# Patient Record
Sex: MALE | Race: BLACK OR AFRICAN AMERICAN | NOT HISPANIC OR LATINO | Employment: OTHER | URBAN - METROPOLITAN AREA
[De-identification: names, ages, dates, MRNs, and addresses within clinical notes are randomized per-mention and may not be internally consistent; named-entity substitution may affect disease eponyms.]

---

## 2018-10-22 ENCOUNTER — OFFICE VISIT (OUTPATIENT)
Dept: INTERNAL MEDICINE | Facility: CLINIC | Age: 63
End: 2018-10-22
Payer: COMMERCIAL

## 2018-10-22 ENCOUNTER — HOSPITAL ENCOUNTER (OUTPATIENT)
Dept: RADIOLOGY | Facility: HOSPITAL | Age: 63
Discharge: HOME | End: 2018-10-22
Attending: FAMILY MEDICINE
Payer: COMMERCIAL

## 2018-10-22 VITALS
WEIGHT: 136.69 LBS | DIASTOLIC BLOOD PRESSURE: 80 MMHG | TEMPERATURE: 99 F | OXYGEN SATURATION: 97 % | HEART RATE: 86 BPM | BODY MASS INDEX: 20.24 KG/M2 | SYSTOLIC BLOOD PRESSURE: 122 MMHG | HEIGHT: 69 IN

## 2018-10-22 DIAGNOSIS — M54.41 CHRONIC BILATERAL LOW BACK PAIN WITH BILATERAL SCIATICA: Chronic | ICD-10-CM

## 2018-10-22 DIAGNOSIS — I10 ESSENTIAL HYPERTENSION: Chronic | ICD-10-CM

## 2018-10-22 DIAGNOSIS — M54.42 CHRONIC BILATERAL LOW BACK PAIN WITH BILATERAL SCIATICA: Chronic | ICD-10-CM

## 2018-10-22 DIAGNOSIS — Z85.46 HISTORY OF PROSTATE CANCER: ICD-10-CM

## 2018-10-22 DIAGNOSIS — Z86.73 HISTORY OF TRANSIENT ISCHEMIC ATTACK (TIA): Primary | ICD-10-CM

## 2018-10-22 DIAGNOSIS — R73.09 ABNORMAL GLUCOSE: ICD-10-CM

## 2018-10-22 DIAGNOSIS — G89.29 CHRONIC BILATERAL LOW BACK PAIN WITH BILATERAL SCIATICA: Chronic | ICD-10-CM

## 2018-10-22 DIAGNOSIS — N39.498 OTHER URINARY INCONTINENCE: ICD-10-CM

## 2018-10-22 PROBLEM — R32 ABSENCE OF BLADDER CONTINENCE: Status: ACTIVE | Noted: 2018-10-22

## 2018-10-22 PROCEDURE — 99204 OFFICE O/P NEW MOD 45 MIN: CPT | Mod: S$GLB,,, | Performed by: FAMILY MEDICINE

## 2018-10-22 PROCEDURE — 72100 X-RAY EXAM L-S SPINE 2/3 VWS: CPT | Mod: TC,FY,PO

## 2018-10-22 PROCEDURE — 99999 PR PBB SHADOW E&M-NEW PATIENT-LVL V: CPT | Mod: PBBFAC,,, | Performed by: FAMILY MEDICINE

## 2018-10-22 PROCEDURE — 3008F BODY MASS INDEX DOCD: CPT | Mod: CPTII,S$GLB,, | Performed by: FAMILY MEDICINE

## 2018-10-22 PROCEDURE — 72100 X-RAY EXAM L-S SPINE 2/3 VWS: CPT | Mod: 26,,, | Performed by: RADIOLOGY

## 2018-10-22 RX ORDER — ASPIRIN 81 MG/1
81 TABLET ORAL DAILY
Refills: 0 | COMMUNITY
Start: 2018-10-22 | End: 2022-09-02

## 2018-10-22 RX ORDER — AMLODIPINE BESYLATE 10 MG/1
10 TABLET ORAL DAILY
Qty: 90 TABLET | Refills: 0 | Status: SHIPPED | OUTPATIENT
Start: 2018-10-22 | End: 2019-01-11 | Stop reason: SDUPTHER

## 2018-10-22 RX ORDER — AMLODIPINE BESYLATE 10 MG/1
TABLET ORAL
Refills: 0 | COMMUNITY
Start: 2018-10-04 | End: 2018-10-22 | Stop reason: SDUPTHER

## 2018-10-22 NOTE — PROGRESS NOTES
CHIEF COMPLAINT  Establish Care      This is my first time treating him here. All problems addressed today are NEW TO ME.     HISTORY OF PRESENT ILLNESS    PROBLEM/CONDITION: He reports recent trip to emergency department for TIA, apparently precipitated by uncontrolled hypertension because he was not taking his amlodipine 5 mg. He was given a prescription for amlodipine 10 mg and has been taking this medication since then, and his blood pressure appears well-controlled, and he appears to be tolerating this medication well without adverse effect. I reviewed test results from [facility] emergency department, including HIV negative glucose 136, total protein 8.5, , troponin less than 0.01, BNP 27, hemoglobin 16.7, hematocrit 48.0, platelets 204,000, CT scan of head negative, chest x-ray negative.    PROBLEM/CONDITION: Nonspecific nonfasting elevated glucose noted. He reports no polyuria or polydipsia.    PROBLEM/CONDITION: He has history of prostate cancer and is overdue for PSA monitoring.    PROBLEM/CONDITION: He reports that he has had MILD urinary leakage ever since his prostatectomy. He reports no dysuria or hematuria.    PROBLEM/CONDITION: He reports greater than one year history of chronic recurrent bilateral low back pain with radiation down legs bilaterally, EXACERBATED by prolonged sitting. We discussed differential diagnosis.    Problem List Items Addressed This Visit        Neuro    History of transient ischemic attack (TIA) - Primary       Cardiac/Vascular    Essential hypertension (Chronic)    Relevant Medications    amLODIPine (NORVASC) 10 MG tablet    aspirin (ECOTRIN) 81 MG EC tablet    Other Relevant Orders    Comprehensive metabolic panel    Lipid panel    TSH    SCHEDULED EKG 12-LEAD (to Muse)       Renal/    Absence of bladder continence    Relevant Orders    Ambulatory referral to Urology       Oncology    History of prostate cancer    Relevant Orders    PSA, Screening     Ambulatory referral to Urology       Endocrine    Abnormal glucose    Relevant Medications    aspirin (ECOTRIN) 81 MG EC tablet    Other Relevant Orders    Hemoglobin A1c    Comprehensive metabolic panel    Lipid panel    TSH       Orthopedic    Chronic bilateral low back pain with bilateral sciatica (Chronic)    Relevant Orders    X-Ray Lumbar Spine AP And Lateral    Ambulatory Referral to Physical/Occupational Therapy          PAST MEDICAL HISTORY REVIEWED AND UPDATED  Past Medical History:   Diagnosis Date    Hypertension     Prostate cancer        SURGICAL HISTORY REVIEWED AND UPDATED  Past Surgical History:   Procedure Laterality Date    HERNIA REPAIR  Dec. 2011    PROSTATECTOMY  2009       CURRENT MEDICATION LIST REVIEWED AND UPDATED  Outpatient Encounter Medications as of 10/22/2018   Medication Sig Dispense Refill    AFLURIA QUAD [id number], PF, 60 mcg/0.5 mL vaccine ADM 0.5ML IM UTD  0    amLODIPine (NORVASC) 10 MG tablet Take 1 tablet (10 mg total) by mouth once daily. (FOR BLOOD PRESSURE) 90 tablet 0    ibuprofen (ADVIL,MOTRIN) 100 MG tablet Take 200 mg by mouth every 6 (six) hours as needed.        [DISCONTINUED] amLODIPine (NORVASC) 10 MG tablet TK 1 T PO  D FOR 30 DAYS  0    [DISCONTINUED] cyanocobalamin (VITAMIN B-12) 1000 MCG tablet Take 100 mcg by mouth once daily.      [DISCONTINUED] hydrochlorothiazide (HYDRODIURIL) 25 MG tablet Take 25 mg by mouth once daily.        aspirin (ECOTRIN) 81 MG EC tablet Take 1 tablet (81 mg total) by mouth once daily.  0     No facility-administered encounter medications on file as of 10/22/2018.        ALLERGY LIST REVIEWED AND UPDATED  Allergies as of 10/22/2018 - Reviewed 10/22/2018   Allergen Reaction Noted    Lisinopril  10/22/2018       SOCIAL HISTORY REVIEWED AND UPDATED  TOBACCO USE HISTORY:   Social History     Tobacco Use   Smoking Status Current Every Day Smoker    Packs/day: 0.50    Years: 35.00    Pack years: 17.50   Smokeless Tobacco Never  "Used     ALCOHOL USE HISTORY:  Social History     Substance and Sexual Activity   Alcohol Use Yes    Alcohol/week: 1.0 oz    Types: 2 Standard drinks or equivalent per week     RECREATIONAL DRUG USE HISTORY:  Social History     Substance and Sexual Activity   Drug Use No       FAMILY HISTORY REVIEWED AND UPDATED  Family History   Problem Relation Age of Onset    Stroke Mother     Prostate cancer Father 79    Cancer Sister         brain     Diabetes Sister     Coronary artery disease Brother        REVIEW OF SYSTEMS  CONSTITUTIONAL: No fever reported.   CARDIOVASCULAR: No angina reported.   PULMONARY: No hemoptysis reported.   PSYCHIATRIC: No mood instability reported.   NEUROLOGIC: No seizures reported.   ENDOCRINE: No polydipsia reported.   GASTROINTESTINAL: No blood in stool reported.   GENITOURINARY: No hematuria reported.   ENT: No acute hearing changes reported.   OPHTHALMOLOGIC: No acute vision changes reported.   HEMATOLOGIC: No bleeding problems reported.   MUSCULOSKELETAL: No recent injuries reported.   DERMATOLOGIC: No skin infection reported.   REMAINDER OF COMPLETE REVIEW OF SYSTEMS is negative or noncontributory to present illness except as noted herein.    PHYSICAL EXAM  Vitals:    10/22/18 1618   BP: 122/80   BP Location: Right arm   Patient Position: Sitting   BP Method: Medium (Manual)   Pulse: 86   Temp: 98.9 °F (37.2 °C)   TempSrc: Tympanic   SpO2: 97%   Weight: 62 kg (136 lb 11 oz)   Height: 5' 9" (1.753 m)     CONSTITUTIONAL: Vital signs noted. No apparent distress. Does not appear acutely ill or septic. Appears adequately hydrated.   EYE: Sclerae anicteric. Lids and conjunctiva unremarkable.   ENT: External ENT unremarkable. Oropharynx moist.   NECK: Trachea midline. Thyroid nontender.   PULM: Lungs clear. Breathing unlabored.   CV: Auscultation reveals regular rate and rhythm without murmur, gallop or rub. No carotid bruit.   GI: Soft and nontender. Bowel sounds normal.   DERM: Skin " warm and moist with normal turgor.   NEURO: There are no gross focal motor deficits or gross deficits of cranial nerves III-XII.   PSYCH: Alert and oriented x 3. Mood is grossly neutral. Affect appropriate. Judgment and insight not grossly compromised.   MSK: Grossly normal stance and gait. Thoracolumbar spine palpates normally and reasonably symmetric.  Straight-leg raise test is positive bilaterally. LE strength, and DTRs symmetric and normal.      ASSESSMENT and PLAN  History of transient ischemic attack (TIA)    Chronic bilateral low back pain with bilateral sciatica  -     X-Ray Lumbar Spine AP And Lateral; Future; Expected date: 10/22/2018  -     Ambulatory Referral to Physical/Occupational Therapy    History of prostate cancer  -     PSA, Screening; Future; Expected date: 10/22/2018  -     Ambulatory referral to Urology    Essential hypertension  -     amLODIPine (NORVASC) 10 MG tablet; Take 1 tablet (10 mg total) by mouth once daily. (FOR BLOOD PRESSURE)  Dispense: 90 tablet; Refill: 0  -     Comprehensive metabolic panel; Future; Expected date: 10/22/2018  -     Lipid panel; Future; Expected date: 10/22/2018  -     TSH; Future; Expected date: 10/22/2018  -     aspirin (ECOTRIN) 81 MG EC tablet; Take 1 tablet (81 mg total) by mouth once daily.; Refill: 0  -     SCHEDULED EKG 12-LEAD (to Muse); Future; Expected date: 10/23/2018    Abnormal glucose  -     Hemoglobin A1c; Future; Expected date: 10/22/2018  -     Comprehensive metabolic panel; Future; Expected date: 10/22/2018  -     Lipid panel; Future; Expected date: 10/22/2018  -     TSH; Future; Expected date: 10/22/2018  -     aspirin (ECOTRIN) 81 MG EC tablet; Take 1 tablet (81 mg total) by mouth once daily.; Refill: 0    Other urinary incontinence  -     Ambulatory referral to Urology        PRESCRIPTION MEDICATION MANAGEMENT  Medications Ordered This Encounter   Medications    amLODIPine (NORVASC) 10 MG tablet     Sig: Take 1 tablet (10 mg total) by  "mouth once daily. (FOR BLOOD PRESSURE)     Dispense:  90 tablet     Refill:  0    aspirin (ECOTRIN) 81 MG EC tablet     Sig: Take 1 tablet (81 mg total) by mouth once daily.     Refill:  0     Medications Discontinued During This Encounter   Medication Reason    cyanocobalamin (VITAMIN B-12) 1000 MCG tablet Patient no longer taking    hydrochlorothiazide (HYDRODIURIL) 25 MG tablet Patient no longer taking    amLODIPine (NORVASC) 10 MG tablet Reorder       Follow-up in about 1 week (around 10/29/2018) for review test results and discuss treatment plan, re-evaluate problem(s) discussed today.    There are no Patient Instructions on file for this visit.    ABOUT THIS DOCUMENTATION:  · The order of the conditions listed in the HPI is one of convenience and does not necessarily reflect the chronology of the appointment, nor the relative importance of a condition.  · Documentation entered by me for this encounter was done in part using speech-recognition technology. Although I have made an effort to ensure accuracy, "sound like" errors may exist and should be interpreted in context.                        -ADY Dawn MD     "

## 2018-10-23 ENCOUNTER — OFFICE VISIT (OUTPATIENT)
Dept: UROLOGY | Facility: CLINIC | Age: 63
End: 2018-10-23
Payer: COMMERCIAL

## 2018-10-23 ENCOUNTER — CLINICAL SUPPORT (OUTPATIENT)
Dept: CARDIOLOGY | Facility: CLINIC | Age: 63
End: 2018-10-23
Payer: COMMERCIAL

## 2018-10-23 ENCOUNTER — PATIENT MESSAGE (OUTPATIENT)
Dept: ADMINISTRATIVE | Facility: OTHER | Age: 63
End: 2018-10-23

## 2018-10-23 VITALS
SYSTOLIC BLOOD PRESSURE: 148 MMHG | DIASTOLIC BLOOD PRESSURE: 76 MMHG | HEART RATE: 59 BPM | WEIGHT: 136.69 LBS | HEIGHT: 69 IN | BODY MASS INDEX: 20.24 KG/M2

## 2018-10-23 DIAGNOSIS — C61 PROSTATE CANCER: Primary | ICD-10-CM

## 2018-10-23 DIAGNOSIS — R32 URINARY INCONTINENCE, UNSPECIFIED TYPE: ICD-10-CM

## 2018-10-23 DIAGNOSIS — N52.9 ERECTILE DYSFUNCTION, UNSPECIFIED ERECTILE DYSFUNCTION TYPE: ICD-10-CM

## 2018-10-23 DIAGNOSIS — I10 ESSENTIAL HYPERTENSION: Chronic | ICD-10-CM

## 2018-10-23 LAB
BILIRUB SERPL-MCNC: NORMAL MG/DL
BLOOD URINE, POC: NORMAL
COLOR, POC UA: YELLOW
GLUCOSE UR QL STRIP: NORMAL
KETONES UR QL STRIP: NORMAL
LEUKOCYTE ESTERASE URINE, POC: NORMAL
NITRITE, POC UA: NORMAL
PH, POC UA: 7
PROTEIN, POC: NORMAL
SPECIFIC GRAVITY, POC UA: 1.01
UROBILINOGEN, POC UA: NORMAL

## 2018-10-23 PROCEDURE — 99999 PR PBB SHADOW E&M-EST. PATIENT-LVL III: CPT | Mod: PBBFAC,,, | Performed by: UROLOGY

## 2018-10-23 PROCEDURE — 99244 OFF/OP CNSLTJ NEW/EST MOD 40: CPT | Mod: 25,S$GLB,, | Performed by: UROLOGY

## 2018-10-23 PROCEDURE — 93000 ELECTROCARDIOGRAM COMPLETE: CPT | Mod: S$GLB,,, | Performed by: INTERNAL MEDICINE

## 2018-10-23 PROCEDURE — 81002 URINALYSIS NONAUTO W/O SCOPE: CPT | Mod: S$GLB,,, | Performed by: UROLOGY

## 2018-10-23 RX ORDER — PAPAVERINE HYDROCHLORIDE 30 MG/ML
INJECTION INTRAMUSCULAR; INTRAVENOUS
Qty: 10 ML | Refills: 5 | Status: SHIPPED | OUTPATIENT
Start: 2018-10-23 | End: 2022-09-02

## 2018-10-23 RX ORDER — OXYBUTYNIN CHLORIDE 5 MG/1
5 TABLET ORAL 3 TIMES DAILY
Qty: 90 TABLET | Refills: 11 | Status: ON HOLD | OUTPATIENT
Start: 2018-10-23 | End: 2022-09-02

## 2018-10-23 NOTE — PROGRESS NOTES
Chief Complaint: Prostate Cancer    HPI:   10/23/18: 62 yo man had open RRP in 2009 with a [place] urologist he can't remember who, the hospital, or any other details.  Says PSA was undetectable 2 years ago.  No abd/pelvic pain and no exac/rel factors.  No hematuria.  No urolithiasis.  Has incontinence when he gets sexually excited.  Sudden urgency.  No leakage other times, just the urgency.  Drinks two cups coffee a day and some tea.  Referred by Dr. Dawn.  Starts to get an erection but no more than that.  Has tried viagra it didn't work.    Allergies:  Lisinopril    Medications:  has a current medication list which includes the following prescription(s): afluria quad 4126-4297 (pf), amlodipine, aspirin, and ibuprofen.    Review of Systems:  General: No fever, chills, fatigability, or weight loss.  Skin: No rashes, itching, or changes in color or texture of skin.  Chest: Denies PUCKETT, cyanosis, wheezing, cough, and sputum production.  Abdomen: Appetite fine. No weight loss. Denies diarrhea, abdominal pain, hematemesis, or blood in stool.  Musculoskeletal: No joint stiffness or swelling. Chronic back pain.  : As above.  All other review of systems negative.    PMH:   has a past medical history of Hypertension and Prostate cancer.    PSH:   has a past surgical history that includes Hernia repair (Dec. 2011) and Prostatectomy (2009).    FamHx: family history includes Cancer in his sister; Coronary artery disease in his brother; Diabetes in his sister; Prostate cancer (age of onset: 79) in his father; Stroke in his mother.    SocHx:  reports that he has been smoking.  He has a 17.50 pack-year smoking history. he has never used smokeless tobacco. He reports that he drinks about 1.0 oz of alcohol per week. He reports that he does not use drugs.      Physical Exam:  Vitals:    10/23/18 1711   BP: (!) 148/76   Pulse: (!) 59     General: A&Ox3, no apparent distress, no deformities  Neck: No masses, normal  thyroid  Lungs: normal inspiration, no use of accessory muscles  Heart: normal pulse, no arrhythmias  Abdomen: Soft, NT, ND, no masses, no hernias, no hepatosplenomegaly  Lymphatic: Neck and groin nodes negative  Skin: The skin is warm and dry. No jaundice.  Ext: No c/c/e.  : Test desc torrie, no abnormalities of epididymus. Penis normal, with normal penile and scrotal skin. Meatus normal.     Labs/Studies: Urinalysis performed in clinic, summary: UA normal exc tr blood    Impression/Plan:   1. PSA today and PSA/RTC 1 year  2. Oxybutinin for OAB, with caffeine cessation and timed voiding.  3. Discussed trimix for ED, rx given.

## 2018-10-23 NOTE — LETTER
October 23, 2018      ADY Dwan MD  [address] [facility] [address]           [facility]  [address] [place] [id number]  Phone: [phone]  Fax: [phone]          Patient: Gunner Estes   MR Number: [id number]   YOB: 1955   Date of Visit: 10/23/2018       Dear Dr. ADY Dawn:    Thank you for referring Gunner Estes to me for evaluation. Attached you will find relevant portions of my assessment and plan of care.    If you have questions, please do not hesitate to call me. I look forward to following Gunner Estes along with you.    Sincerely,    Jorge Coker IV, MD    Enclosure  CC:  No Recipients    If you would like to receive this communication electronically, please contact [email or web address] or [phone] to request more information on [employer] access.    For providers and/or their staff who would like to refer a patient to [facility], please contact us through our one-stop-shop provider referral line, [facility], at [phone].    If you feel you have received this communication in error or would no longer like to receive these types of communications, please e-mail [email or web address]

## 2018-10-28 NOTE — PROGRESS NOTES
"Hi, Gunner.    I'm happy to report that your EKG is NORMAL.    If you have any questions about your test results, write them down and bring them with you to your next appointment. You can call or message me in the meantime if you have urgent questions.    Thank you for letting me care for you. I look forward to seeing you at your next appointment.    Sincerely,    ADY Dawn MD    P.S. - Want to learn more about your test results and what they mean? It's as simple as 1, 2, 3.     (1) Log in to your [facility] account at [email or web address]     (2) From the "View test results" tab, click on the test you want to know more about.     (3) Click on the "About This Test" link."

## 2018-10-29 NOTE — PROGRESS NOTES
"Hi, Gunner.    Your lumbar spine x-rays showed "degenerative" or "wear-and-tear" type changes. The x-rays did NOT show any fracture or evidence of a tumor.    If you have any questions about your test results, write them down and bring them with you to your next appointment. You can call or message me in the meantime if you have urgent questions.    Thank you for letting me care for you. I look forward to seeing you at your next appointment.    Sincerely,    ADY Dawn MD    P.S. - Want to learn more about your test results and what they mean? It's as simple as 1, 2, 3.     (1) Log in to your [facility] account at [email or web address]     (2) From the "View test results" tab, click on the test you want to know more about.     (3) Click on the "About This Test" link."

## 2018-10-30 ENCOUNTER — CLINICAL SUPPORT (OUTPATIENT)
Dept: UROLOGY | Facility: CLINIC | Age: 63
End: 2018-10-30
Payer: COMMERCIAL

## 2018-10-30 ENCOUNTER — LAB VISIT (OUTPATIENT)
Dept: LAB | Facility: HOSPITAL | Age: 63
End: 2018-10-30
Attending: UROLOGY
Payer: COMMERCIAL

## 2018-10-30 DIAGNOSIS — R32 URINARY INCONTINENCE, UNSPECIFIED TYPE: ICD-10-CM

## 2018-10-30 DIAGNOSIS — N52.9 ERECTILE DYSFUNCTION, UNSPECIFIED ERECTILE DYSFUNCTION TYPE: Primary | ICD-10-CM

## 2018-10-30 DIAGNOSIS — C61 PROSTATE CANCER: ICD-10-CM

## 2018-10-30 DIAGNOSIS — N52.9 ERECTILE DYSFUNCTION, UNSPECIFIED ERECTILE DYSFUNCTION TYPE: ICD-10-CM

## 2018-10-30 LAB — COMPLEXED PSA SERPL-MCNC: 0.24 NG/ML

## 2018-10-30 PROCEDURE — 99999 PR PBB SHADOW E&M-EST. PATIENT-LVL II: CPT | Mod: PBBFAC,,,

## 2018-10-30 PROCEDURE — 36415 COLL VENOUS BLD VENIPUNCTURE: CPT

## 2018-10-30 PROCEDURE — 84153 ASSAY OF PSA TOTAL: CPT

## 2018-10-30 NOTE — PROGRESS NOTES
Taught patient to self-administer Trimix injection using normal saline. Patient rhiannon up medication as instructed using clean technique and identified proper injection site. Advised patient to go to ER if he has an erection lasting longer than 4 hours. Patient states understanding.

## 2018-11-16 ENCOUNTER — PATIENT MESSAGE (OUTPATIENT)
Dept: UROLOGY | Facility: CLINIC | Age: 63
End: 2018-11-16

## 2018-11-16 ENCOUNTER — PATIENT MESSAGE (OUTPATIENT)
Dept: INTERNAL MEDICINE | Facility: CLINIC | Age: 63
End: 2018-11-16

## 2019-01-07 ENCOUNTER — PATIENT MESSAGE (OUTPATIENT)
Dept: INTERNAL MEDICINE | Facility: CLINIC | Age: 64
End: 2019-01-07

## 2019-01-08 ENCOUNTER — PATIENT MESSAGE (OUTPATIENT)
Dept: INTERNAL MEDICINE | Facility: CLINIC | Age: 64
End: 2019-01-08

## 2019-01-09 ENCOUNTER — PATIENT MESSAGE (OUTPATIENT)
Dept: FAMILY MEDICINE | Facility: CLINIC | Age: 64
End: 2019-01-09

## 2019-01-11 ENCOUNTER — LAB VISIT (OUTPATIENT)
Dept: LAB | Facility: HOSPITAL | Age: 64
End: 2019-01-11
Payer: COMMERCIAL

## 2019-01-11 ENCOUNTER — OFFICE VISIT (OUTPATIENT)
Dept: FAMILY MEDICINE | Facility: CLINIC | Age: 64
End: 2019-01-11
Payer: COMMERCIAL

## 2019-01-11 VITALS
TEMPERATURE: 99 F | WEIGHT: 139.56 LBS | HEART RATE: 72 BPM | BODY MASS INDEX: 20.67 KG/M2 | OXYGEN SATURATION: 95 % | SYSTOLIC BLOOD PRESSURE: 124 MMHG | HEIGHT: 69 IN | RESPIRATION RATE: 16 BRPM | DIASTOLIC BLOOD PRESSURE: 84 MMHG

## 2019-01-11 DIAGNOSIS — I10 ESSENTIAL HYPERTENSION: Primary | Chronic | ICD-10-CM

## 2019-01-11 DIAGNOSIS — M54.42 CHRONIC BILATERAL LOW BACK PAIN WITH BILATERAL SCIATICA: Chronic | ICD-10-CM

## 2019-01-11 DIAGNOSIS — Z11.59 ENCOUNTER FOR HEPATITIS C SCREENING TEST FOR LOW RISK PATIENT: ICD-10-CM

## 2019-01-11 DIAGNOSIS — Z86.73 HISTORY OF TRANSIENT ISCHEMIC ATTACK (TIA): ICD-10-CM

## 2019-01-11 DIAGNOSIS — Z85.46 HISTORY OF PROSTATE CANCER: ICD-10-CM

## 2019-01-11 DIAGNOSIS — M54.41 CHRONIC BILATERAL LOW BACK PAIN WITH BILATERAL SCIATICA: Chronic | ICD-10-CM

## 2019-01-11 DIAGNOSIS — R73.09 ABNORMAL GLUCOSE: ICD-10-CM

## 2019-01-11 DIAGNOSIS — I10 ESSENTIAL HYPERTENSION: Chronic | ICD-10-CM

## 2019-01-11 DIAGNOSIS — G89.29 CHRONIC BILATERAL LOW BACK PAIN WITH BILATERAL SCIATICA: Chronic | ICD-10-CM

## 2019-01-11 DIAGNOSIS — Z23 NEED FOR DIPHTHERIA-TETANUS-PERTUSSIS (TDAP) VACCINE: ICD-10-CM

## 2019-01-11 DIAGNOSIS — Z23 NEED FOR VACCINATION WITH 13-POLYVALENT PNEUMOCOCCAL CONJUGATE VACCINE: ICD-10-CM

## 2019-01-11 LAB
ALBUMIN SERPL BCP-MCNC: 3.6 G/DL
ALP SERPL-CCNC: 103 U/L
ALT SERPL W/O P-5'-P-CCNC: 9 U/L
ANION GAP SERPL CALC-SCNC: 8 MMOL/L
AST SERPL-CCNC: 19 U/L
BILIRUB SERPL-MCNC: 0.2 MG/DL
BUN SERPL-MCNC: 9 MG/DL
CALCIUM SERPL-MCNC: 9.5 MG/DL
CHLORIDE SERPL-SCNC: 104 MMOL/L
CHOLEST SERPL-MCNC: 158 MG/DL
CHOLEST/HDLC SERPL: 4.3 {RATIO}
CO2 SERPL-SCNC: 29 MMOL/L
CREAT SERPL-MCNC: 0.9 MG/DL
EST. GFR  (AFRICAN AMERICAN): >60 ML/MIN/1.73 M^2
EST. GFR  (NON AFRICAN AMERICAN): >60 ML/MIN/1.73 M^2
GLUCOSE SERPL-MCNC: 103 MG/DL
HDLC SERPL-MCNC: 37 MG/DL
HDLC SERPL: 23.4 %
LDLC SERPL CALC-MCNC: 93.2 MG/DL
NONHDLC SERPL-MCNC: 121 MG/DL
POTASSIUM SERPL-SCNC: 4.7 MMOL/L
PROT SERPL-MCNC: 7.7 G/DL
SODIUM SERPL-SCNC: 141 MMOL/L
TRIGL SERPL-MCNC: 139 MG/DL
TSH SERPL DL<=0.005 MIU/L-ACNC: 0.76 UIU/ML

## 2019-01-11 PROCEDURE — 80061 LIPID PANEL: CPT

## 2019-01-11 PROCEDURE — 90670 PNEUMOCOCCAL CONJUGATE VACCINE 13-VALENT LESS THAN 5YO & GREATER THAN: ICD-10-PCS | Mod: S$GLB,,, | Performed by: FAMILY MEDICINE

## 2019-01-11 PROCEDURE — 90471 IMMUNIZATION ADMIN: CPT | Mod: 59,S$GLB,, | Performed by: FAMILY MEDICINE

## 2019-01-11 PROCEDURE — 3079F PR MOST RECENT DIASTOLIC BLOOD PRESSURE 80-89 MM HG: ICD-10-PCS | Mod: CPTII,S$GLB,, | Performed by: FAMILY MEDICINE

## 2019-01-11 PROCEDURE — 99999 PR PBB SHADOW E&M-EST. PATIENT-LVL IV: ICD-10-PCS | Mod: PBBFAC,,, | Performed by: FAMILY MEDICINE

## 2019-01-11 PROCEDURE — 86803 HEPATITIS C AB TEST: CPT

## 2019-01-11 PROCEDURE — 3008F PR BODY MASS INDEX (BMI) DOCUMENTED: ICD-10-PCS | Mod: CPTII,S$GLB,, | Performed by: FAMILY MEDICINE

## 2019-01-11 PROCEDURE — 99214 OFFICE O/P EST MOD 30 MIN: CPT | Mod: 25,S$GLB,, | Performed by: FAMILY MEDICINE

## 2019-01-11 PROCEDURE — 84443 ASSAY THYROID STIM HORMONE: CPT

## 2019-01-11 PROCEDURE — 90715 TDAP VACCINE GREATER THAN OR EQUAL TO 7YO IM: ICD-10-PCS | Mod: S$GLB,,, | Performed by: FAMILY MEDICINE

## 2019-01-11 PROCEDURE — 3074F PR MOST RECENT SYSTOLIC BLOOD PRESSURE < 130 MM HG: ICD-10-PCS | Mod: CPTII,S$GLB,, | Performed by: FAMILY MEDICINE

## 2019-01-11 PROCEDURE — 90472 TDAP VACCINE GREATER THAN OR EQUAL TO 7YO IM: ICD-10-PCS | Mod: S$GLB,,, | Performed by: FAMILY MEDICINE

## 2019-01-11 PROCEDURE — 90670 PCV13 VACCINE IM: CPT | Mod: S$GLB,,, | Performed by: FAMILY MEDICINE

## 2019-01-11 PROCEDURE — 99214 PR OFFICE/OUTPT VISIT, EST, LEVL IV, 30-39 MIN: ICD-10-PCS | Mod: 25,S$GLB,, | Performed by: FAMILY MEDICINE

## 2019-01-11 PROCEDURE — 83036 HEMOGLOBIN GLYCOSYLATED A1C: CPT

## 2019-01-11 PROCEDURE — 3079F DIAST BP 80-89 MM HG: CPT | Mod: CPTII,S$GLB,, | Performed by: FAMILY MEDICINE

## 2019-01-11 PROCEDURE — 80053 COMPREHEN METABOLIC PANEL: CPT

## 2019-01-11 PROCEDURE — 90715 TDAP VACCINE 7 YRS/> IM: CPT | Mod: S$GLB,,, | Performed by: FAMILY MEDICINE

## 2019-01-11 PROCEDURE — 99999 PR PBB SHADOW E&M-EST. PATIENT-LVL IV: CPT | Mod: PBBFAC,,, | Performed by: FAMILY MEDICINE

## 2019-01-11 PROCEDURE — 90472 IMMUNIZATION ADMIN EACH ADD: CPT | Mod: S$GLB,,, | Performed by: FAMILY MEDICINE

## 2019-01-11 PROCEDURE — 3074F SYST BP LT 130 MM HG: CPT | Mod: CPTII,S$GLB,, | Performed by: FAMILY MEDICINE

## 2019-01-11 PROCEDURE — 36415 COLL VENOUS BLD VENIPUNCTURE: CPT | Mod: PO

## 2019-01-11 PROCEDURE — 90471 PNEUMOCOCCAL CONJUGATE VACCINE 13-VALENT LESS THAN 5YO & GREATER THAN: ICD-10-PCS | Mod: 59,S$GLB,, | Performed by: FAMILY MEDICINE

## 2019-01-11 PROCEDURE — 3008F BODY MASS INDEX DOCD: CPT | Mod: CPTII,S$GLB,, | Performed by: FAMILY MEDICINE

## 2019-01-11 RX ORDER — AMLODIPINE BESYLATE 10 MG/1
10 TABLET ORAL DAILY
Qty: 90 TABLET | Refills: 3 | Status: SHIPPED | OUTPATIENT
Start: 2019-01-11 | End: 2019-01-16 | Stop reason: SDUPTHER

## 2019-01-11 NOTE — PROGRESS NOTES
-------------------------------  HISTORY OF PRESENT ILLNESS  --------------------------------    PROBLEM/CONDITION: Hypertension appears well-controlled. No angina or angina equivalent symptoms reported.     PROBLEM/CONDITION: He reports no recurrence of previously noted TIA symptoms.     PROBLEM/CONDITION: Abnormal glucose noted on previous labs. We are evaluating further with A1c measurement.     PROBLEM/CONDITION: Previously noted back pain is much improved after physical therapy.     PROBLEM/CONDITION: He continues to follow up with his urologist for surveillance after prostate cancer.    Management of prescription drugs was part of medical decision making for this encounter.    --------------------------------  REVIEW OF SYSTEMS  --------------------------------    CONSTITUTIONAL: No fever reported.  CARDIOVASCULAR: No chest pain reported.  PULMONARY: No trouble breathing reported.    --------------------------------  PHYSICAL EXAM  --------------------------------    CONSTITUTIONAL: Vital signs noted. No apparent distress. Does not appear acutely ill or septic. Appears adequately hydrated.  PULM: Breathing unlabored.  HEART: Regular.  DERM: Skin normothermic with normal turgor.  PSYCHIATRIC: Alert and oriented x 3. Mood is grossly neutral. Affect appropriate. Judgment and insight not grossly compromised.      CHIEF COMPLAINT: Follow-up and Medication Refill       Problem List Items Addressed This Visit        Neuro    History of transient ischemic attack (TIA)       Cardiac/Vascular    Essential hypertension - Primary (Chronic)    Relevant Medications    amLODIPine (NORVASC) 10 MG tablet    Other Relevant Orders    Comprehensive metabolic panel    TSH    Lipid panel       Oncology    History of prostate cancer       Endocrine    Abnormal glucose    Relevant Orders    Hemoglobin A1c    Comprehensive metabolic panel    TSH    Lipid panel       Orthopedic    Chronic bilateral low back pain with bilateral sciatica  "(Chronic)      Other Visit Diagnoses     Encounter for hepatitis C screening test for low risk patient        Relevant Orders    Hepatitis C antibody    Need for diphtheria-tetanus-pertussis (Tdap) vaccine        Relevant Orders    Pneumococcal Conjugate Vaccine (13 Valent) (IM) (Completed)    Tdap Vaccine (Completed)    Need for vaccination with 13-polyvalent pneumococcal conjugate vaccine        Relevant Orders    Pneumococcal Conjugate Vaccine (13 Valent) (IM) (Completed)          He has a current medication list which includes the following prescription(s): amlodipine, aspirin, oxybutynin, and papaverine.    Vitals:    01/11/19 1354   BP: 124/84   BP Location: Left arm   Patient Position: Sitting   BP Method: Medium (Manual)   Pulse: 72   Resp: 16   Temp: 98.6 °F (37 °C)   TempSrc: Oral   SpO2: 95%   Weight: 63.3 kg (139 lb 8.8 oz)   Height: 5' 9" (1.753 m)       Essential hypertension  -     amLODIPine (NORVASC) 10 MG tablet; Take 1 tablet (10 mg total) by mouth once daily. (FOR BLOOD PRESSURE)  Dispense: 90 tablet; Refill: 3  -     Comprehensive metabolic panel; Future; Expected date: 01/11/2019  -     TSH; Future; Expected date: 01/11/2019  -     Lipid panel; Future; Expected date: 01/11/2019    History of transient ischemic attack (TIA)    Abnormal glucose  -     Hemoglobin A1c; Future; Expected date: 01/11/2019  -     Comprehensive metabolic panel; Future; Expected date: 01/11/2019  -     TSH; Future; Expected date: 01/11/2019  -     Lipid panel; Future; Expected date: 01/11/2019    Chronic bilateral low back pain with bilateral sciatica    Encounter for hepatitis C screening test for low risk patient  -     Hepatitis C antibody; Future; Expected date: 01/11/2019    Need for diphtheria-tetanus-pertussis (Tdap) vaccine  -     Pneumococcal Conjugate Vaccine (13 Valent) (IM)  -     Tdap Vaccine    Need for vaccination with 13-polyvalent pneumococcal conjugate vaccine  -     Pneumococcal Conjugate Vaccine (13 " "Valent) (IM)    History of prostate cancer        Medications Ordered This Encounter   Medications    amLODIPine (NORVASC) 10 MG tablet     Sig: Take 1 tablet (10 mg total) by mouth once daily. (FOR BLOOD PRESSURE)     Dispense:  90 tablet     Refill:  3       Medications Discontinued During This Encounter   Medication Reason    ibuprofen (ADVIL,MOTRIN) 100 MG tablet Therapy completed    AFLURIA QUAD [id number], PF, 60 mcg/0.5 mL vaccine Therapy completed    amLODIPine (NORVASC) 10 MG tablet Reorder       Follow-up in about 11 months (around 12/2/2019) for wellness and preventive services.    There are no Patient Instructions on file for this visit.    · Documentation entered by me for this encounter may have been done in part using speech-recognition technology. Although I have made an effort to ensure accuracy, "sound like" errors may exist and should be interpreted in context. -ADY Dawn MD      "

## 2019-01-12 LAB
ESTIMATED AVG GLUCOSE: 123 MG/DL
HBA1C MFR BLD HPLC: 5.9 %

## 2019-01-14 LAB — HCV AB SERPL QL IA: NEGATIVE

## 2019-01-15 ENCOUNTER — PATIENT MESSAGE (OUTPATIENT)
Dept: FAMILY MEDICINE | Facility: CLINIC | Age: 64
End: 2019-01-15

## 2019-01-16 ENCOUNTER — PATIENT MESSAGE (OUTPATIENT)
Dept: INTERNAL MEDICINE | Facility: CLINIC | Age: 64
End: 2019-01-16

## 2019-01-16 DIAGNOSIS — I10 ESSENTIAL HYPERTENSION: Chronic | ICD-10-CM

## 2019-01-16 NOTE — TELEPHONE ENCOUNTER
Attempted to call patient in reference to [employer] message no answer [facility]. Also sent [employer] message

## 2019-01-16 NOTE — TELEPHONE ENCOUNTER
----- Message from America Nelson sent at 1/16/2019 11:05 AM CST -----  Contact: self   Returning call. Please call back at [phone].      Thanks,  America Nelson

## 2019-01-17 ENCOUNTER — PATIENT MESSAGE (OUTPATIENT)
Dept: INTERNAL MEDICINE | Facility: CLINIC | Age: 64
End: 2019-01-17

## 2019-01-17 RX ORDER — AMLODIPINE BESYLATE 10 MG/1
10 TABLET ORAL DAILY
Qty: 90 TABLET | Refills: 3 | Status: SHIPPED | OUTPATIENT
Start: 2019-01-17 | End: 2022-09-02

## 2019-01-20 NOTE — PROGRESS NOTES
"Hi, Gunner.      I'm happy to report that these test results are NORMAL or at least ACCEPTABLE.    If you have any questions about your test results, write them down and bring them with you to your next appointment. You can call or message me in the meantime if you have urgent questions.    Thank you for letting me care for you. I look forward to seeing you at your next appointment.    Sincerely,    ADY Dawn MD    P.S. - Want to learn more about your test results and what they mean? It's as simple as 1, 2, 3.     (1) Log in to your [facility] account at [email or web address]     (2) From the "View test results" tab, click on the test you want to know more about.     (3) Click on the "About This Test" link."

## 2020-05-07 DIAGNOSIS — U07.1 COVID-19 VIRUS DETECTED: ICD-10-CM

## 2020-10-06 ENCOUNTER — PATIENT MESSAGE (OUTPATIENT)
Dept: ADMINISTRATIVE | Facility: HOSPITAL | Age: 65
End: 2020-10-06

## 2021-04-28 ENCOUNTER — PATIENT MESSAGE (OUTPATIENT)
Dept: RESEARCH | Facility: HOSPITAL | Age: 66
End: 2021-04-28

## 2022-08-24 ENCOUNTER — HOSPITAL ENCOUNTER (OUTPATIENT)
Facility: HOSPITAL | Age: 67
Discharge: HOME | End: 2022-08-26
Attending: FAMILY MEDICINE | Admitting: INTERNAL MEDICINE
Payer: MEDICAID

## 2022-08-24 DIAGNOSIS — R07.9 CHEST PAIN: ICD-10-CM

## 2022-08-24 DIAGNOSIS — R11.10 VOMITING, INTRACTABILITY OF VOMITING NOT SPECIFIED, PRESENCE OF NAUSEA NOT SPECIFIED, UNSPECIFIED VOMITING TYPE: Primary | ICD-10-CM

## 2022-08-24 DIAGNOSIS — K94.23 PEG TUBE MALFUNCTION: ICD-10-CM

## 2022-08-24 PROBLEM — I71.43 ANEURYSM OF INFRARENAL ABDOMINAL AORTA: Status: ACTIVE | Noted: 2022-08-24

## 2022-08-24 PROBLEM — E87.20 LACTIC ACIDOSIS: Status: ACTIVE | Noted: 2022-08-24

## 2022-08-24 PROBLEM — R65.10 SIRS (SYSTEMIC INFLAMMATORY RESPONSE SYNDROME): Status: ACTIVE | Noted: 2022-08-24

## 2022-08-24 PROBLEM — I63.9 STROKE: Status: ACTIVE | Noted: 2022-08-24

## 2022-08-24 PROBLEM — K94.22 INFECTION OF PEG SITE: Status: ACTIVE | Noted: 2022-08-24

## 2022-08-24 PROBLEM — N28.9 RENAL LESION: Status: ACTIVE | Noted: 2022-08-24

## 2022-08-24 PROBLEM — E11.9 DIABETES: Status: ACTIVE | Noted: 2022-08-24

## 2022-08-24 LAB
ALBUMIN SERPL BCP-MCNC: 3.9 G/DL (ref 3.5–5.2)
ALP SERPL-CCNC: 140 U/L (ref 55–135)
ALT SERPL W/O P-5'-P-CCNC: 95 U/L (ref 10–44)
ANION GAP SERPL CALC-SCNC: 14 MMOL/L (ref 8–16)
AST SERPL-CCNC: 51 U/L (ref 10–40)
BASOPHILS # BLD AUTO: 0.04 K/UL (ref 0–0.2)
BASOPHILS NFR BLD: 0.3 % (ref 0–1.9)
BILIRUB SERPL-MCNC: 0.6 MG/DL (ref 0.1–1)
BUN SERPL-MCNC: 14 MG/DL (ref 8–23)
CALCIUM SERPL-MCNC: 9.9 MG/DL (ref 8.7–10.5)
CHLORIDE SERPL-SCNC: 104 MMOL/L (ref 95–110)
CO2 SERPL-SCNC: 22 MMOL/L (ref 23–29)
CREAT SERPL-MCNC: 0.8 MG/DL (ref 0.5–1.4)
DIFFERENTIAL METHOD: ABNORMAL
EOSINOPHIL # BLD AUTO: 0.2 K/UL (ref 0–0.5)
EOSINOPHIL NFR BLD: 1.1 % (ref 0–8)
ERYTHROCYTE [DISTWIDTH] IN BLOOD BY AUTOMATED COUNT: 14.1 % (ref 11.5–14.5)
EST. GFR  (NO RACE VARIABLE): >60 ML/MIN/1.73 M^2
GLUCOSE SERPL-MCNC: 106 MG/DL (ref 70–110)
HCT VFR BLD AUTO: 56 % (ref 40–54)
HGB BLD-MCNC: 19.2 G/DL (ref 14–18)
IMM GRANULOCYTES # BLD AUTO: 0.06 K/UL (ref 0–0.04)
IMM GRANULOCYTES NFR BLD AUTO: 0.4 % (ref 0–0.5)
LACTATE SERPL-SCNC: 3.4 MMOL/L (ref 0.5–2.2)
LIPASE SERPL-CCNC: 31 U/L (ref 4–60)
LYMPHOCYTES # BLD AUTO: 1.3 K/UL (ref 1–4.8)
LYMPHOCYTES NFR BLD: 9.4 % (ref 18–48)
MCH RBC QN AUTO: 30.2 PG (ref 27–31)
MCHC RBC AUTO-ENTMCNC: 34.3 G/DL (ref 32–36)
MCV RBC AUTO: 88 FL (ref 82–98)
MONOCYTES # BLD AUTO: 1.1 K/UL (ref 0.3–1)
MONOCYTES NFR BLD: 7.8 % (ref 4–15)
NEUTROPHILS # BLD AUTO: 11 K/UL (ref 1.8–7.7)
NEUTROPHILS NFR BLD: 81 % (ref 38–73)
NRBC BLD-RTO: 0 /100 WBC
PLATELET # BLD AUTO: 176 K/UL (ref 150–450)
PMV BLD AUTO: 13.2 FL (ref 9.2–12.9)
POTASSIUM SERPL-SCNC: 4.6 MMOL/L (ref 3.5–5.1)
PROCALCITONIN SERPL IA-MCNC: 0.03 NG/ML
PROT SERPL-MCNC: 8.7 G/DL (ref 6–8.4)
RBC # BLD AUTO: 6.35 M/UL (ref 4.6–6.2)
SARS-COV-2 RDRP RESP QL NAA+PROBE: NEGATIVE
SODIUM SERPL-SCNC: 140 MMOL/L (ref 136–145)
WBC # BLD AUTO: 13.56 K/UL (ref 3.9–12.7)

## 2022-08-24 PROCEDURE — G0378 HOSPITAL OBSERVATION PER HR: HCPCS

## 2022-08-24 PROCEDURE — 36415 COLL VENOUS BLD VENIPUNCTURE: CPT | Performed by: NURSE PRACTITIONER

## 2022-08-24 PROCEDURE — 63600175 PHARM REV CODE 636 W HCPCS: Performed by: NURSE PRACTITIONER

## 2022-08-24 PROCEDURE — 96365 THER/PROPH/DIAG IV INF INIT: CPT

## 2022-08-24 PROCEDURE — 83036 HEMOGLOBIN GLYCOSYLATED A1C: CPT | Performed by: NURSE PRACTITIONER

## 2022-08-24 PROCEDURE — U0002 COVID-19 LAB TEST NON-CDC: HCPCS | Performed by: FAMILY MEDICINE

## 2022-08-24 PROCEDURE — 84145 PROCALCITONIN (PCT): CPT | Performed by: FAMILY MEDICINE

## 2022-08-24 PROCEDURE — 63600175 PHARM REV CODE 636 W HCPCS: Performed by: FAMILY MEDICINE

## 2022-08-24 PROCEDURE — 87040 BLOOD CULTURE FOR BACTERIA: CPT | Performed by: FAMILY MEDICINE

## 2022-08-24 PROCEDURE — 25000003 PHARM REV CODE 250: Performed by: FAMILY MEDICINE

## 2022-08-24 PROCEDURE — 96361 HYDRATE IV INFUSION ADD-ON: CPT

## 2022-08-24 PROCEDURE — 96367 TX/PROPH/DG ADDL SEQ IV INF: CPT

## 2022-08-24 PROCEDURE — 85025 COMPLETE CBC W/AUTO DIFF WBC: CPT | Performed by: FAMILY MEDICINE

## 2022-08-24 PROCEDURE — 25000003 PHARM REV CODE 250: Performed by: NURSE PRACTITIONER

## 2022-08-24 PROCEDURE — 99285 EMERGENCY DEPT VISIT HI MDM: CPT | Mod: 25

## 2022-08-24 PROCEDURE — 83605 ASSAY OF LACTIC ACID: CPT | Mod: 91 | Performed by: NURSE PRACTITIONER

## 2022-08-24 PROCEDURE — 80053 COMPREHEN METABOLIC PANEL: CPT | Performed by: FAMILY MEDICINE

## 2022-08-24 PROCEDURE — 83690 ASSAY OF LIPASE: CPT | Performed by: FAMILY MEDICINE

## 2022-08-24 PROCEDURE — 83605 ASSAY OF LACTIC ACID: CPT | Performed by: FAMILY MEDICINE

## 2022-08-24 PROCEDURE — 96375 TX/PRO/DX INJ NEW DRUG ADDON: CPT

## 2022-08-24 RX ORDER — SODIUM CHLORIDE 0.9 % (FLUSH) 0.9 %
10 SYRINGE (ML) INJECTION EVERY 12 HOURS PRN
Status: DISCONTINUED | OUTPATIENT
Start: 2022-08-25 | End: 2022-08-26 | Stop reason: HOSPADM

## 2022-08-24 RX ORDER — CEFEPIME HYDROCHLORIDE 1 G/50ML
2 INJECTION, SOLUTION INTRAVENOUS
Status: DISCONTINUED | OUTPATIENT
Start: 2022-08-24 | End: 2022-08-26 | Stop reason: HOSPADM

## 2022-08-24 RX ORDER — ATORVASTATIN CALCIUM 40 MG/1
40 TABLET, FILM COATED ORAL NIGHTLY
COMMUNITY

## 2022-08-24 RX ORDER — INSULIN ASPART 100 [IU]/ML
INJECTION, SOLUTION INTRAVENOUS; SUBCUTANEOUS
Status: ON HOLD | COMMUNITY
End: 2022-08-26 | Stop reason: HOSPADM

## 2022-08-24 RX ORDER — HUMAN INSULIN 100 [IU]/ML
INJECTION, SUSPENSION SUBCUTANEOUS
COMMUNITY

## 2022-08-24 RX ORDER — GLUCAGON 1 MG
1 KIT INJECTION
Status: DISCONTINUED | OUTPATIENT
Start: 2022-08-25 | End: 2022-08-26 | Stop reason: HOSPADM

## 2022-08-24 RX ORDER — INSULIN ASPART 100 [IU]/ML
0-5 INJECTION, SOLUTION INTRAVENOUS; SUBCUTANEOUS
Status: DISCONTINUED | OUTPATIENT
Start: 2022-08-25 | End: 2022-08-24

## 2022-08-24 RX ORDER — ONDANSETRON 2 MG/ML
4 INJECTION INTRAMUSCULAR; INTRAVENOUS ONCE
Status: DISCONTINUED | OUTPATIENT
Start: 2022-08-24 | End: 2022-08-24

## 2022-08-24 RX ORDER — INSULIN ASPART 100 [IU]/ML
0-5 INJECTION, SOLUTION INTRAVENOUS; SUBCUTANEOUS EVERY 6 HOURS PRN
Status: DISCONTINUED | OUTPATIENT
Start: 2022-08-25 | End: 2022-08-26 | Stop reason: HOSPADM

## 2022-08-24 RX ORDER — NAPROXEN SODIUM 220 MG/1
81 TABLET, FILM COATED ORAL DAILY
Status: DISCONTINUED | OUTPATIENT
Start: 2022-08-25 | End: 2022-08-26 | Stop reason: HOSPADM

## 2022-08-24 RX ORDER — ONDANSETRON 2 MG/ML
4 INJECTION INTRAMUSCULAR; INTRAVENOUS ONCE
Status: COMPLETED | OUTPATIENT
Start: 2022-08-24 | End: 2022-08-24

## 2022-08-24 RX ORDER — BACLOFEN 10 MG/1
10 TABLET ORAL DAILY
COMMUNITY

## 2022-08-24 RX ORDER — ATORVASTATIN CALCIUM 40 MG/1
40 TABLET, FILM COATED ORAL DAILY
Status: DISCONTINUED | OUTPATIENT
Start: 2022-08-25 | End: 2022-08-26 | Stop reason: HOSPADM

## 2022-08-24 RX ORDER — INSULIN GLARGINE 100 [IU]/ML
17 INJECTION, SOLUTION SUBCUTANEOUS 2 TIMES DAILY
COMMUNITY

## 2022-08-24 RX ORDER — ENOXAPARIN SODIUM 100 MG/ML
40 INJECTION SUBCUTANEOUS EVERY 24 HOURS
Status: DISCONTINUED | OUTPATIENT
Start: 2022-08-25 | End: 2022-08-26 | Stop reason: HOSPADM

## 2022-08-24 RX ORDER — DOXYCYCLINE HYCLATE 100 MG
100 TABLET ORAL EVERY 12 HOURS
Status: DISCONTINUED | OUTPATIENT
Start: 2022-08-24 | End: 2022-08-26 | Stop reason: HOSPADM

## 2022-08-24 RX ORDER — FLUOXETINE HYDROCHLORIDE 40 MG/1
20 CAPSULE ORAL DAILY
COMMUNITY

## 2022-08-24 RX ORDER — IBUPROFEN 200 MG
24 TABLET ORAL
Status: DISCONTINUED | OUTPATIENT
Start: 2022-08-25 | End: 2022-08-26 | Stop reason: HOSPADM

## 2022-08-24 RX ORDER — AMLODIPINE BESYLATE 10 MG/1
10 TABLET ORAL DAILY
Status: DISCONTINUED | OUTPATIENT
Start: 2022-08-24 | End: 2022-08-26 | Stop reason: HOSPADM

## 2022-08-24 RX ORDER — ONDANSETRON 2 MG/ML
4 INJECTION INTRAMUSCULAR; INTRAVENOUS EVERY 6 HOURS PRN
Status: DISCONTINUED | OUTPATIENT
Start: 2022-08-24 | End: 2022-08-26 | Stop reason: HOSPADM

## 2022-08-24 RX ORDER — BACLOFEN 10 MG/1
10 TABLET ORAL NIGHTLY
Status: DISCONTINUED | OUTPATIENT
Start: 2022-08-25 | End: 2022-08-26 | Stop reason: HOSPADM

## 2022-08-24 RX ORDER — IBUPROFEN 200 MG
16 TABLET ORAL
Status: DISCONTINUED | OUTPATIENT
Start: 2022-08-25 | End: 2022-08-26 | Stop reason: HOSPADM

## 2022-08-24 RX ORDER — NALOXONE HCL 0.4 MG/ML
0.02 VIAL (ML) INJECTION
Status: DISCONTINUED | OUTPATIENT
Start: 2022-08-25 | End: 2022-08-26 | Stop reason: HOSPADM

## 2022-08-24 RX ORDER — FLUOXETINE HYDROCHLORIDE 20 MG/1
40 CAPSULE ORAL DAILY
Status: DISCONTINUED | OUTPATIENT
Start: 2022-08-25 | End: 2022-08-26 | Stop reason: HOSPADM

## 2022-08-24 RX ADMIN — ONDANSETRON 4 MG: 2 INJECTION INTRAMUSCULAR; INTRAVENOUS at 07:08

## 2022-08-24 RX ADMIN — CEFEPIME HYDROCHLORIDE 2 G: 2 INJECTION, SOLUTION INTRAVENOUS at 11:08

## 2022-08-24 RX ADMIN — DOXYCYCLINE HYCLATE 100 MG: 100 TABLET, COATED ORAL at 11:08

## 2022-08-24 RX ADMIN — AMLODIPINE BESYLATE 10 MG: 10 TABLET ORAL at 11:08

## 2022-08-24 RX ADMIN — SODIUM CHLORIDE, SODIUM LACTATE, POTASSIUM CHLORIDE, AND CALCIUM CHLORIDE 2058 ML: .6; .31; .03; .02 INJECTION, SOLUTION INTRAVENOUS at 07:08

## 2022-08-24 RX ADMIN — PIPERACILLIN AND TAZOBACTAM 4.5 G: 4; .5 INJECTION, POWDER, LYOPHILIZED, FOR SOLUTION INTRAVENOUS; PARENTERAL at 08:08

## 2022-08-24 NOTE — ED NOTES
Patient unable to give urine at this time. Will check back with patient to collect sample. Urinal at bedside.

## 2022-08-24 NOTE — ED NOTES
Bed: 07  Expected date:   Expected time:   Means of arrival: Ambulance Service  Comments:  [place]

## 2022-08-24 NOTE — ED PROVIDER NOTES
SCRIBE #1 NOTE: I, Miguel Angel Jc, am scribing for, and in the presence of, Amber Adair MD. I have scribed the entire note.       History     Chief Complaint   Patient presents with    peg tub issue      Pt. Presents to ED via [facility] due to his PEG tube having minor bleeding noted. PEG tube is still in.      Review of patient's allergies indicates:   Allergen Reactions    Lisinopril Swelling         History of Present Illness     HPI    8/24/2022, 6:53 PM  History obtained from pt's sister and pt      History of Present Illness: Gunner Estes is a 66 y.o. male patient with a PMHx of cognitive communication deficit, HTN, dysphagia, prostate cancer, type II DM, and CVA who presents to the Emergency Department for evaluation of minor bleeding on PEG tube which onset today. Pt currently resides at nursing home and said he was pulling on PEG tube earlier today. A similar situation occurred in May of 2022 when the pt pulled PEG tube out. Symptoms are constant and moderate in severity. No mitigating or exacerbating factors reported. Associated sxs include generalized mild abdominal pain and n/v. Patient denies any fever, cough, SOB, syncope, dizziness, dysuria, CP, and all other sxs at this time. No prior Tx reported. No further complaints or concerns at this time.       Arrival mode: Ambulance Service    PCP: VIOLETTA Dawn MD        Past Medical History:  Past Medical History:   Diagnosis Date    Aphasia     Dysphagia     Dysphagia     Hypertension     Mixed hyperlipidemia     Prostate cancer     Stroke     Type 2 diabetes mellitus with unspecified diabetic retinopathy without macular edema        Past Surgical History:  Past Surgical History:   Procedure Laterality Date    GASTROSTOMY TUBE PLACEMENT      HERNIA REPAIR  12/01/2011    PROSTATECTOMY  01/01/2009         Family History:  Family History   Problem Relation Age of Onset    Stroke Mother     Prostate cancer Father 79    Cancer Sister          brain     Diabetes Sister     Coronary artery disease Brother        Social History:  Social History     Tobacco Use    Smoking status: Current Every Day Smoker     Packs/day: 0.50     Years: 35.00     Pack years: 17.50    Smokeless tobacco: Never Used   Substance and Sexual Activity    Alcohol use: Yes     Alcohol/week: 1.7 standard drinks     Types: 2 Standard drinks or equivalent per week    Drug use: No    Sexual activity: Yes     Partners: Female        Review of Systems     Review of Systems   Constitutional: Negative for fever.   HENT: Negative for sore throat.    Respiratory: Negative for cough and shortness of breath.    Cardiovascular: Negative for chest pain.   Gastrointestinal: Positive for abdominal pain (generalized mild), nausea and vomiting.        (+) minor bleeding on PEG tube   Genitourinary: Negative for dysuria.   Musculoskeletal: Negative for back pain.   Skin: Negative for rash.   Neurological: Negative for dizziness, syncope and weakness.   Hematological: Does not bruise/bleed easily.   All other systems reviewed and are negative.     Physical Exam     Initial Vitals [08/24/22 1247]   BP Pulse Resp Temp SpO2   (!) 143/93 76 18 98.5 °F (36.9 °C) 97 %      MAP       --          Physical Exam  Nursing Notes and Vital Signs Reviewed.  Constitutional: Patient is in no acute distress. Well-developed and well-nourished.  Head: Atraumatic. Normocephalic.  Eyes: PERRL. EOM intact. Conjunctivae are not pale. No scleral icterus.  ENT: Mucous membranes are moist. Oropharynx is clear and symmetric.    Neck: Supple. Full ROM.  Cardiovascular: Regular rate. Regular rhythm. No murmurs, rubs, or gallops. Distal pulses are 2+ and symmetric.  Pulmonary/Chest: No respiratory distress. Clear to auscultation bilaterally. No wheezing or rales.  Abdominal: Soft and non-distended.  There is no tenderness.  No rebound, guarding, or rigidity. PEG tube with some bloody drainage. Active  vomiting.  Musculoskeletal: Moves all extremities. No obvious deformities. No edema.  Skin: Warm and dry.  Neurological:  Alert, awake, and appropriate.  Slurred speech.  R hemiplegia.  Psychiatric: Normal affect. Good eye contact. Appropriate in content.     ED Course   Procedures  ED Vital Signs:  Vitals:    08/25/22 2003 08/25/22 2159 08/25/22 2339 08/26/22 0043   BP: 130/82   (!) 140/80   Pulse: 83 88 92 77   Resp: 19   18   Temp: 98 °F (36.7 °C)   98.4 °F (36.9 °C)   TempSrc: Oral   Oral   SpO2: (!) 93%   (!) 94%   Weight:       Height:        08/26/22 0120 08/26/22 0313 08/26/22 0528 08/26/22 0558   BP:   133/78    Pulse: 85 82 72 79   Resp:   17    Temp:   98.4 °F (36.9 °C)    TempSrc:   Oral    SpO2:   (!) 93%    Weight:       Height:        08/26/22 0706 08/26/22 0935 08/26/22 1125 08/26/22 1126   BP: (!) 143/86   136/82   Pulse: 76 71 82 75   Resp: 18 16 16 18   Temp: 97.8 °F (36.6 °C)   98.3 °F (36.8 °C)   TempSrc: Oral   Oral   SpO2: (!) 94%   (!) 92%   Weight:       Height:        08/26/22 1330 08/26/22 1523 08/26/22 1535   BP:  135/83 128/79   Pulse: 85 78 78   Resp: 16  18   Temp:   97.6 °F (36.4 °C)   TempSrc:   Oral   SpO2:  (!) 93% 95%   Weight:      Height:          Abnormal Lab Results:  Labs Reviewed   CBC W/ AUTO DIFFERENTIAL - Abnormal; Notable for the following components:       Result Value    WBC 13.56 (*)     RBC 6.35 (*)     Hemoglobin 19.2 (*)     Hematocrit 56.0 (*)     MPV 13.2 (*)     Gran # (ANC) 11.0 (*)     Immature Grans (Abs) 0.06 (*)     Mono # 1.1 (*)     Gran % 81.0 (*)     Lymph % 9.4 (*)     All other components within normal limits   COMPREHENSIVE METABOLIC PANEL - Abnormal; Notable for the following components:    CO2 22 (*)     Total Protein 8.7 (*)     Alkaline Phosphatase 140 (*)     AST 51 (*)     ALT 95 (*)     All other components within normal limits   LACTIC ACID, PLASMA - Abnormal; Notable for the following components:    Lactate (Lactic Acid) 3.4 (*)     All  other components within normal limits   PROCALCITONIN   LIPASE   SARS-COV-2 RNA AMPLIFICATION, QUAL   SARS-COV-2 RDRP GENE        All Lab Results:  Results for orders placed or performed during the hospital encounter of 08/24/22   Blood culture x two cultures. Draw prior to antibiotics.    Specimen: Peripheral, Antecubital, Left; Blood   Result Value Ref Range    Blood Culture, Routine No Growth to date     Blood Culture, Routine No Growth to date    Blood culture x two cultures. Draw prior to antibiotics.    Specimen: Peripheral, Antecubital, Left; Blood   Result Value Ref Range    Blood Culture, Routine No Growth to date     Blood Culture, Routine No Growth to date    CBC auto differential   Result Value Ref Range    WBC 13.56 (H) 3.90 - 12.70 K/uL    RBC 6.35 (H) 4.60 - 6.20 M/uL    Hemoglobin 19.2 (H) 14.0 - 18.0 g/dL    Hematocrit 56.0 (H) 40.0 - 54.0 %    MCV 88 82 - 98 fL    MCH 30.2 27.0 - 31.0 pg    MCHC 34.3 32.0 - 36.0 g/dL    RDW 14.1 11.5 - 14.5 %    Platelets 176 150 - 450 K/uL    MPV 13.2 (H) 9.2 - 12.9 fL    Immature Granulocytes 0.4 0.0 - 0.5 %    Gran # (ANC) 11.0 (H) 1.8 - 7.7 K/uL    Immature Grans (Abs) 0.06 (H) 0.00 - 0.04 K/uL    Lymph # 1.3 1.0 - 4.8 K/uL    Mono # 1.1 (H) 0.3 - 1.0 K/uL    Eos # 0.2 0.0 - 0.5 K/uL    Baso # 0.04 0.00 - 0.20 K/uL    nRBC 0 0 /100 WBC    Gran % 81.0 (H) 38.0 - 73.0 %    Lymph % 9.4 (L) 18.0 - 48.0 %    Mono % 7.8 4.0 - 15.0 %    Eosinophil % 1.1 0.0 - 8.0 %    Basophil % 0.3 0.0 - 1.9 %    Differential Method Automated    Comprehensive metabolic panel   Result Value Ref Range    Sodium 140 136 - 145 mmol/L    Potassium 4.6 3.5 - 5.1 mmol/L    Chloride 104 95 - 110 mmol/L    CO2 22 (L) 23 - 29 mmol/L    Glucose 106 70 - 110 mg/dL    BUN 14 8 - 23 mg/dL    Creatinine 0.8 0.5 - 1.4 mg/dL    Calcium 9.9 8.7 - 10.5 mg/dL    Total Protein 8.7 (H) 6.0 - 8.4 g/dL    Albumin 3.9 3.5 - 5.2 g/dL    Total Bilirubin 0.6 0.1 - 1.0 mg/dL    Alkaline Phosphatase 140 (H) 55 -  135 U/L    AST 51 (H) 10 - 40 U/L    ALT 95 (H) 10 - 44 U/L    Anion Gap 14 8 - 16 mmol/L    eGFR >60 >60 mL/min/1.73 m^2   Lactic acid, plasma #1   Result Value Ref Range    Lactate (Lactic Acid) 3.4 (H) 0.5 - 2.2 mmol/L   Urinalysis, Reflex to Urine Culture Urine, Clean Catch    Specimen: Urine   Result Value Ref Range    Specimen UA Urine, Clean Catch     Color, UA Yellow Yellow, Straw, Karoline    Appearance, UA Clear Clear    pH, UA 7.0 5.0 - 8.0    Specific Gravity, UA 1.015 1.005 - 1.030    Protein, UA Negative Negative    Glucose, UA Negative Negative    Ketones, UA Negative Negative    Bilirubin (UA) Negative Negative    Occult Blood UA Negative Negative    Nitrite, UA Negative Negative    Urobilinogen, UA Negative <2.0 EU/dL    Leukocytes, UA Negative Negative   Procalcitonin   Result Value Ref Range    Procalcitonin 0.03 <0.25 ng/mL   Lipase   Result Value Ref Range    Lipase 31 4 - 60 U/L   COVID-19 Rapid Screening   Result Value Ref Range    SARS-CoV-2 RNA, Amplification, Qual Negative Negative   Lactic acid, plasma #2   Result Value Ref Range    Lactate (Lactic Acid) 3.2 (H) 0.5 - 2.2 mmol/L   Hemoglobin A1c   Result Value Ref Range    Hemoglobin A1C 5.5 4.0 - 5.6 %    Estimated Avg Glucose 111 68 - 131 mg/dL   Comprehensive Metabolic Panel (CMP)   Result Value Ref Range    Sodium 137 136 - 145 mmol/L    Potassium 4.2 3.5 - 5.1 mmol/L    Chloride 105 95 - 110 mmol/L    CO2 23 23 - 29 mmol/L    Glucose 116 (H) 70 - 110 mg/dL    BUN 14 8 - 23 mg/dL    Creatinine 0.8 0.5 - 1.4 mg/dL    Calcium 8.8 8.7 - 10.5 mg/dL    Total Protein 6.5 6.0 - 8.4 g/dL    Albumin 3.2 (L) 3.5 - 5.2 g/dL    Total Bilirubin 0.6 0.1 - 1.0 mg/dL    Alkaline Phosphatase 110 55 - 135 U/L    AST 37 10 - 40 U/L    ALT 69 (H) 10 - 44 U/L    Anion Gap 9 8 - 16 mmol/L    eGFR >60 >60 mL/min/1.73 m^2   CBC Without Differential   Result Value Ref Range    WBC 10.66 3.90 - 12.70 K/uL    RBC 5.56 4.60 - 6.20 M/uL    Hemoglobin 16.7 14.0 - 18.0  g/dL    Hematocrit 49.5 40.0 - 54.0 %    MCV 89 82 - 98 fL    MCH 30.0 27.0 - 31.0 pg    MCHC 33.7 32.0 - 36.0 g/dL    RDW 14.0 11.5 - 14.5 %    Platelets 141 (L) 150 - 450 K/uL    MPV 13.3 (H) 9.2 - 12.9 fL   Lactic acid, plasma   Result Value Ref Range    Lactate (Lactic Acid) 1.2 0.5 - 2.2 mmol/L   Comprehensive Metabolic Panel (CMP)   Result Value Ref Range    Sodium 140 136 - 145 mmol/L    Potassium 4.2 3.5 - 5.1 mmol/L    Chloride 108 95 - 110 mmol/L    CO2 23 23 - 29 mmol/L    Glucose 115 (H) 70 - 110 mg/dL    BUN 14 8 - 23 mg/dL    Creatinine 0.7 0.5 - 1.4 mg/dL    Calcium 9.0 8.7 - 10.5 mg/dL    Total Protein 6.6 6.0 - 8.4 g/dL    Albumin 3.2 (L) 3.5 - 5.2 g/dL    Total Bilirubin 0.4 0.1 - 1.0 mg/dL    Alkaline Phosphatase 111 55 - 135 U/L    AST 27 10 - 40 U/L    ALT 53 (H) 10 - 44 U/L    Anion Gap 9 8 - 16 mmol/L    eGFR >60 >60 mL/min/1.73 m^2   CBC Without Differential   Result Value Ref Range    WBC 8.05 3.90 - 12.70 K/uL    RBC 5.40 4.60 - 6.20 M/uL    Hemoglobin 16.3 14.0 - 18.0 g/dL    Hematocrit 48.1 40.0 - 54.0 %    MCV 89 82 - 98 fL    MCH 30.2 27.0 - 31.0 pg    MCHC 33.9 32.0 - 36.0 g/dL    RDW 14.0 11.5 - 14.5 %    Platelets 131 (L) 150 - 450 K/uL    MPV 13.0 (H) 9.2 - 12.9 fL   POCT glucose   Result Value Ref Range    POCT Glucose 92 70 - 110 mg/dL   POCT glucose   Result Value Ref Range    POCT Glucose 111 (H) 70 - 110 mg/dL   POCT glucose   Result Value Ref Range    POCT Glucose 109 70 - 110 mg/dL   POCT glucose   Result Value Ref Range    POCT Glucose 128 (H) 70 - 110 mg/dL   POCT glucose   Result Value Ref Range    POCT Glucose 116 (H) 70 - 110 mg/dL   POCT glucose   Result Value Ref Range    POCT Glucose 115 (H) 70 - 110 mg/dL   POCT glucose   Result Value Ref Range    POCT Glucose 117 (H) 70 - 110 mg/dL         Imaging Results:  Imaging Results           X-Ray Chest AP Portable (Final result)  Result time 08/24/22 19:40:25    Final result by Morris Castillo MD (08/24/22 19:40:25)                  Impression:      As above.    This report was flagged in Epic as abnormal.      Electronically signed by: Morris Castillo  Date:    08/24/2022  Time:    19:40             Narrative:    EXAMINATION:  XR CHEST AP PORTABLE    CLINICAL HISTORY:  Sepsis;    TECHNIQUE:  Single frontal view of the chest was performed.    COMPARISON:  None    FINDINGS:  Bibasilar opacities possibly related to interstitial lung disease.  Edema or early infection difficult to exclude but less favored.  No pleural fluid or pneumothorax.    The cardiac silhouette is normal in size. The hilar and mediastinal contours are unremarkable.    Bones are intact.                                CT Abdomen Pelvis  Without Contrast (Final result)  Result time 08/24/22 19:29:02    Final result by Morris Castillo MD (08/24/22 19:29:02)                 Impression:      Gastrostomy tube in place within the stomach.  Mild inflammatory stranding about the gastrostomy tube at the level of the skin surface.    1.3 cm indeterminate superior pole left renal lesion.  Outpatient ultrasound recommended for more complete characterization if not previously obtained.    Bibasilar posterior dependent densities favored over aspiration or atelectasis.  Correlation is advised.    3.4 cm infrarenal abdominal aortic aneurysm.    Additional details as above.    This report was flagged in Epic as abnormal.    All CT scans at this facility are performed  using dose modulation techniques as appropriate to performed exam including the following:  automated exposure control; adjustment of mA and/or kV according to the patients size (this includes techniques or standardized protocols for targeted exams where dose is matched to indication/reason for exam: i.e. extremities or head);  iterative reconstruction technique.      Electronically signed by: Morris Castillo  Date:    08/24/2022  Time:    19:29             Narrative:    EXAMINATION:  CT ABDOMEN PELVIS WITHOUT  CONTRAST    CLINICAL HISTORY:  Abdominal pain, acute, nonlocalized;    TECHNIQUE:  Low dose axial images, sagittal and coronal reformations were obtained from the lung bases to the pubic symphysis.  Contrast was not administered.    COMPARISON:  None    FINDINGS:  Heart: Normal in size. No pericardial effusion.    Lung Bases: Bibasilar pulmonary opacities favoring posterior dependent densities but with infection or aspiration difficult to fully exclude.  Probable emphysema.    Liver: Normal in size and attenuation, with no focal hepatic lesions.    Gallbladder: No calcified gallstones.    Bile Ducts: No evidence of dilated ducts.    Pancreas: No mass or peripancreatic fat stranding.    Spleen: Unremarkable.    Adrenals: Unremarkable.    Kidneys/ Ureters: No hydronephrosis.  No obstructive uropathy.  Left greater than right nonobstructive nephrolithiasis.  Indeterminate superior pole 1.3 cm left renal lesion.  Outpatient ultrasound would be recommended for further characterization.    Bladder: No evidence of wall thickening.    Reproductive organs: Status post prostatectomy.    GI Tract/Mesentery: Gastrostomy tube in place within the stomach.  There is mild inflammatory stranding about the gastrostomy tube at the level of the skin surface.  Small bowel and colon appear unremarkable.  No evidence of appendicitis.    Peritoneal Space: No ascites. No free air.    Retroperitoneum: No significant adenopathy.    Abdominal wall: Unremarkable.    Vasculature: Infrarenal abdominal aortic aneurysm.  This measures up to 3.4 cm.  Moderate atherosclerosis.    Bones: No acute fracture.                                      The Emergency Provider reviewed the vital signs and test results, which are outlined above.     ED Discussion       8:01 PM: Discussed pt's case with Solange Solorio NP ([facility]) who recommends that pt needs a cath UA.    8:01 PM: Discussed case with Solange Solorio NP ([facility]). Dr. Elliott agrees  with current care and management of pt and accepts admission.   Admitting Service: Hospital medicine  Admitting Physician: Dr. Elliott  Admit to: obs med/tele    8:02 PM: Re-evaluated pt. I have discussed test results, shared treatment plan, and the need for admission with patient and family at bedside. Pt and family express understanding at this time and agree with all information. All questions answered. Pt and family have no further questions or concerns at this time. Pt is ready for admit.       Medical Decision Making:   Clinical Tests:   Lab Tests: Ordered and Reviewed  Radiological Study: Ordered and Reviewed           ED Medication(s):  Medications   ondansetron injection 4 mg (has no administration in time range)   cefepime in dextrose 5 % IVPB 2 g (0 g Intravenous Stopped 8/26/22 0740)   doxycycline tablet 100 mg (100 mg Per G Tube Given 8/26/22 0942)   atorvastatin tablet 40 mg (40 mg Per G Tube Given 8/26/22 0942)   aspirin chewable tablet 81 mg (81 mg Per G Tube Given 8/26/22 0942)   baclofen tablet 10 mg (10 mg Per G Tube Given 8/25/22 2208)   FLUoxetine capsule 40 mg (40 mg Per G Tube Given 8/26/22 0942)   sodium chloride 0.9% flush 10 mL (has no administration in time range)   naloxone 0.4 mg/mL injection 0.02 mg (has no administration in time range)   glucose chewable tablet 16 g (has no administration in time range)   glucose chewable tablet 24 g (has no administration in time range)   glucagon (human recombinant) injection 1 mg (has no administration in time range)   dextrose 10% bolus 125 mL (has no administration in time range)   dextrose 10% bolus 250 mL (has no administration in time range)   enoxaparin injection 40 mg (40 mg Subcutaneous Given 8/25/22 1718)   insulin aspart U-100 pen 0-5 Units (has no administration in time range)   amLODIPine tablet 10 mg (10 mg Per G Tube Given 8/26/22 0942)   lactated ringers bolus 2,058 mL (0 mL/kg × 68.6 kg Intravenous Stopped 8/24/22 2213)   ondansetron  injection 4 mg (4 mg Intravenous Given 8/24/22 1904)   piperacillin-tazobactam 4.5 g in dextrose 5 % 100 mL IVPB (ready to mix system) (0 g Intravenous Stopped 8/24/22 2044)       Current Discharge Medication List      START taking these medications    Details   amoxicillin-clavulanate 875-125mg (AUGMENTIN) 875-125 mg per tablet 1 tablet by Per G Tube route 2 (two) times daily. for 10 days  Qty: 20 tablet, Refills: 0              Follow-up Information     VIOLETTA Dawn MD Follow up in 3 day(s).    Specialty: Family Medicine  Why: hospital follow up  Contact information:  [address]  [place] [address] [phone]                             Scribe Attestation:   Scribe #1: I performed the above scribed service and the documentation accurately describes the services I performed. I attest to the accuracy of the note.     Attending:   Physician Attestation Statement for Scribe #1: I, Amber Adair MD, personally performed the services described in this documentation, as scribed by Miguel Angel Jc, in my presence, and it is both accurate and complete.           Clinical Impression       ICD-10-CM ICD-9-CM   1. Vomiting, intractability of vomiting not specified, presence of nausea not specified, unspecified vomiting type  R11.10 787.03   2. PEG tube malfunction  K94.23 536.42   3. Chest pain  R07.9 786.50       Disposition:   Disposition: Placed in Observation  Condition: Fair         Amber Adair MD  08/26/22 5628

## 2022-08-24 NOTE — FIRST PROVIDER EVALUATION
Medical screening exam completed.  I have conducted a focused provider triage encounter, findings are as follows:    Brief history of present illness:  Bleeding noted around PEG tube, sent for further evaluation    Vitals:    08/24/22 1247   BP: (!) 143/93   BP Location: Right arm   Patient Position: Sitting   Pulse: 76   Resp: 18   Temp: 98.5 °F (36.9 °C)   TempSrc: Oral   SpO2: 97%       Pertinent physical exam:  No acute distress    Brief workup plan:  Further evaluation once roomed    Preliminary workup initiated; this workup will be continued and followed by the physician or advanced practice provider that is assigned to the patient when roomed.

## 2022-08-25 PROBLEM — D75.1 POLYCYTHEMIA: Status: ACTIVE | Noted: 2022-08-25

## 2022-08-25 LAB
ALBUMIN SERPL BCP-MCNC: 3.2 G/DL (ref 3.5–5.2)
ALP SERPL-CCNC: 110 U/L (ref 55–135)
ALT SERPL W/O P-5'-P-CCNC: 69 U/L (ref 10–44)
ANION GAP SERPL CALC-SCNC: 9 MMOL/L (ref 8–16)
AST SERPL-CCNC: 37 U/L (ref 10–40)
BILIRUB SERPL-MCNC: 0.6 MG/DL (ref 0.1–1)
BILIRUB UR QL STRIP: NEGATIVE
BUN SERPL-MCNC: 14 MG/DL (ref 8–23)
CALCIUM SERPL-MCNC: 8.8 MG/DL (ref 8.7–10.5)
CHLORIDE SERPL-SCNC: 105 MMOL/L (ref 95–110)
CLARITY UR: CLEAR
CO2 SERPL-SCNC: 23 MMOL/L (ref 23–29)
COLOR UR: YELLOW
CREAT SERPL-MCNC: 0.8 MG/DL (ref 0.5–1.4)
ERYTHROCYTE [DISTWIDTH] IN BLOOD BY AUTOMATED COUNT: 14 % (ref 11.5–14.5)
EST. GFR  (NO RACE VARIABLE): >60 ML/MIN/1.73 M^2
ESTIMATED AVG GLUCOSE: 111 MG/DL (ref 68–131)
GLUCOSE SERPL-MCNC: 116 MG/DL (ref 70–110)
GLUCOSE UR QL STRIP: NEGATIVE
HBA1C MFR BLD: 5.5 % (ref 4–5.6)
HCT VFR BLD AUTO: 49.5 % (ref 40–54)
HGB BLD-MCNC: 16.7 G/DL (ref 14–18)
HGB UR QL STRIP: NEGATIVE
KETONES UR QL STRIP: NEGATIVE
LACTATE SERPL-SCNC: 1.2 MMOL/L (ref 0.5–2.2)
LACTATE SERPL-SCNC: 3.2 MMOL/L (ref 0.5–2.2)
LEUKOCYTE ESTERASE UR QL STRIP: NEGATIVE
MCH RBC QN AUTO: 30 PG (ref 27–31)
MCHC RBC AUTO-ENTMCNC: 33.7 G/DL (ref 32–36)
MCV RBC AUTO: 89 FL (ref 82–98)
NITRITE UR QL STRIP: NEGATIVE
PH UR STRIP: 7 [PH] (ref 5–8)
PLATELET # BLD AUTO: 141 K/UL (ref 150–450)
PMV BLD AUTO: 13.3 FL (ref 9.2–12.9)
POCT GLUCOSE: 109 MG/DL (ref 70–110)
POCT GLUCOSE: 111 MG/DL (ref 70–110)
POCT GLUCOSE: 128 MG/DL (ref 70–110)
POCT GLUCOSE: 92 MG/DL (ref 70–110)
POTASSIUM SERPL-SCNC: 4.2 MMOL/L (ref 3.5–5.1)
PROT SERPL-MCNC: 6.5 G/DL (ref 6–8.4)
PROT UR QL STRIP: NEGATIVE
RBC # BLD AUTO: 5.56 M/UL (ref 4.6–6.2)
SODIUM SERPL-SCNC: 137 MMOL/L (ref 136–145)
SP GR UR STRIP: 1.01 (ref 1–1.03)
URN SPEC COLLECT METH UR: NORMAL
UROBILINOGEN UR STRIP-ACNC: NEGATIVE EU/DL
WBC # BLD AUTO: 10.66 K/UL (ref 3.9–12.7)

## 2022-08-25 PROCEDURE — 81003 URINALYSIS AUTO W/O SCOPE: CPT | Performed by: FAMILY MEDICINE

## 2022-08-25 PROCEDURE — 83605 ASSAY OF LACTIC ACID: CPT | Performed by: NURSE PRACTITIONER

## 2022-08-25 PROCEDURE — G0378 HOSPITAL OBSERVATION PER HR: HCPCS

## 2022-08-25 PROCEDURE — 25000003 PHARM REV CODE 250: Performed by: NURSE PRACTITIONER

## 2022-08-25 PROCEDURE — 63600175 PHARM REV CODE 636 W HCPCS: Performed by: NURSE PRACTITIONER

## 2022-08-25 PROCEDURE — 85027 COMPLETE CBC AUTOMATED: CPT | Performed by: NURSE PRACTITIONER

## 2022-08-25 PROCEDURE — 80053 COMPREHEN METABOLIC PANEL: CPT | Performed by: NURSE PRACTITIONER

## 2022-08-25 PROCEDURE — 96372 THER/PROPH/DIAG INJ SC/IM: CPT | Performed by: NURSE PRACTITIONER

## 2022-08-25 PROCEDURE — 36415 COLL VENOUS BLD VENIPUNCTURE: CPT | Performed by: NURSE PRACTITIONER

## 2022-08-25 PROCEDURE — 96366 THER/PROPH/DIAG IV INF ADDON: CPT

## 2022-08-25 RX ADMIN — DOXYCYCLINE HYCLATE 100 MG: 100 TABLET, COATED ORAL at 10:08

## 2022-08-25 RX ADMIN — ATORVASTATIN CALCIUM 40 MG: 40 TABLET, FILM COATED ORAL at 09:08

## 2022-08-25 RX ADMIN — CEFEPIME HYDROCHLORIDE 2 G: 2 INJECTION, SOLUTION INTRAVENOUS at 07:08

## 2022-08-25 RX ADMIN — ENOXAPARIN SODIUM 40 MG: 100 INJECTION SUBCUTANEOUS at 05:08

## 2022-08-25 RX ADMIN — AMLODIPINE BESYLATE 10 MG: 10 TABLET ORAL at 09:08

## 2022-08-25 RX ADMIN — ASPIRIN 81 MG CHEWABLE TABLET 81 MG: 81 TABLET CHEWABLE at 09:08

## 2022-08-25 RX ADMIN — BACLOFEN 10 MG: 10 TABLET ORAL at 10:08

## 2022-08-25 RX ADMIN — FLUOXETINE 40 MG: 20 CAPSULE ORAL at 09:08

## 2022-08-25 RX ADMIN — DOXYCYCLINE HYCLATE 100 MG: 100 TABLET, COATED ORAL at 09:08

## 2022-08-25 RX ADMIN — CEFEPIME HYDROCHLORIDE 2 G: 2 INJECTION, SOLUTION INTRAVENOUS at 03:08

## 2022-08-25 NOTE — PROGRESS NOTES
[facility] Medicine  Progress Note    Patient Name: Gunner Estes  MRN: [id number]  Patient Class: OP- Observation   Admission Date: 8/24/2022  Length of Stay: 0 days  Attending Physician: Steve Elliott MD  Primary Care Provider: VIOLETTA Dawn MD        Subjective:     Principal Problem:SIRS (systemic inflammatory response syndrome)        HPI:  The patient is a 65 yo male nursing home resident with hx Stroke with right sided hemiparesis, Aphasia, Dysphagia s/p PEG placement, Prostate cancer, type II DM, HTN, and Hyperlipidemia who was sent to ED from Nursing home due to minor bleeding from PEG tube site and vomiting- onset today. Pt was seen pulling at PEG tube and there was concern for displacement. While in the ED, the pt was noted to have elevated WBC count and Lactic acid as well as a low grade fever. HM was notified.     In the ED, Temp 100.4F, WBC 13, Hgb 19, LA 3.4, Procalcitonin normal.   CXR showed Bibasilar opacities possibly related to interstitial lung disease.  Edema or early infection difficult to exclude but less favored.   CT abd showed G-tube in place in the stomach, Mild inflammatory stranding about the gastrostomy tube at the level of the skin surface,1.3 cm indeterminate superior pole left renal lesion, Bibasilar posterior dependent densities favored over aspiration or atelectasis, 3.4 cm infrarenal abdominal aortic aneurysm.   UA pending    The patient will be placed in observation under hospital medicine       Overview/Hospital Course:  67 y/o male admitted for infection of peg tube site and started on IV cefepime and doxycycline. Labs reviewed and stable. Wound care consult.           Review of Systems   Unable to perform ROS: Patient nonverbal   Objective:     Vital Signs (Most Recent):  Temp: 99.2 °F (37.3 °C) (08/25/22 1117)  Pulse: 92 (08/25/22 1117)  Resp: 18 (08/25/22 1117)  BP: 114/76 (08/25/22 1117)  SpO2: (!) 92 % (08/25/22 1117)   Vital Signs (24h Range):  Temp:   "[98 °F (36.7 °C)-100.2 °F (37.9 °C)] 99.2 °F (37.3 °C)  Pulse:  [78-97] 92  Resp:  [17-24] 18  SpO2:  [91 %-98 %] 92 %  BP: (114-156)/(76-98) 114/76     Weight: 68.6 kg (151 lb 3.8 oz)  Body mass index is 23.69 kg/m².    Intake/Output Summary (Last 24 hours) at 8/25/2022 1254  Last data filed at 8/25/2022 0800  Gross per 24 hour   Intake 2358 ml   Output --   Net 2358 ml      Physical Exam  Vitals and nursing note reviewed.   Constitutional:       Appearance: He is well-developed.   HENT:      Head: Normocephalic and atraumatic.      Nose: Nose normal.   Eyes:      General: No scleral icterus.     Pupils: Pupils are equal, round, and reactive to light.   Cardiovascular:      Rate and Rhythm: Normal rate and regular rhythm.      Heart sounds: Normal heart sounds. No murmur heard.    No friction rub. No gallop.   Pulmonary:      Effort: Pulmonary effort is normal. No respiratory distress.      Breath sounds: Normal breath sounds. No wheezing.   Abdominal:      General: Bowel sounds are normal. There is no distension.      Palpations: Abdomen is soft.      Tenderness: There is no abdominal tenderness.      Comments: Scant blood tinged drainage at peg tube site    Musculoskeletal:         General: Deformity (right arm mild contracture- splint in place) present.      Cervical back: Normal range of motion and neck supple.   Skin:     General: Skin is warm and dry.      Findings: No rash.   Neurological:      Mental Status: He is alert.      Motor: Weakness (right sided hemiparesis) present.      Comments: Mostly non-verbal-Only responds with "yes" as answers to questions   Aphasia    Psychiatric:         Behavior: Behavior normal.       Significant Labs: All pertinent labs within the past 24 hours have been reviewed.  Blood Culture:   Recent Labs   Lab 08/24/22  1900 08/24/22  1907   LABBLOO No Growth to date No Growth to date     BMP:   Recent Labs   Lab 08/25/22  0559   *      K 4.2      CO2 23   BUN " 14   CREATININE 0.8   CALCIUM 8.8     CBC:   Recent Labs   Lab 08/24/22  1829 08/25/22  0559   WBC 13.56* 10.66   HGB 19.2* 16.7   HCT 56.0* 49.5    141*     CMP:   Recent Labs   Lab 08/24/22  1829 08/25/22  0559    137   K 4.6 4.2    105   CO2 22* 23    116*   BUN 14 14   CREATININE 0.8 0.8   CALCIUM 9.9 8.8   PROT 8.7* 6.5   ALBUMIN 3.9 3.2*   BILITOT 0.6 0.6   ALKPHOS 140* 110   AST 51* 37   ALT 95* 69*   ANIONGAP 14 9     Urine Studies: No results for input(s): COLORU, APPEARANCEUA, PHUR, SPECGRAV, PROTEINUA, GLUCUA, KETONESU, BILIRUBINUA, OCCULTUA, NITRITE, UROBILINOGEN, LEUKOCYTESUR, RBCUA, WBCUA, BACTERIA, SQUAMEPITHEL, HYALINECASTS in the last 48 hours.    Invalid input(s): WRIGHTSUR    Significant Imaging: I have reviewed all pertinent imaging results/findings within the past 24 hours.      Assessment/Plan:      * SIRS (systemic inflammatory response syndrome)  Low grade temp, mild leukocytosis   Possible PNA on CXR  CT abd showed Mild stranding around peg site    UA pending   Empiric IV Cefepime and Doxycycline via PEG  Blood cultures    WBCs and Lactic acid back to baseline  Afebrile   Blood cx pending          Polycythemia  Resolved       Diabetes  Patient's FSGs are controlled on current medication regimen.  Last A1c reviewed-   Lab Results   Component Value Date    HGBA1C 5.5 08/24/2022     Most recent fingerstick glucose reviewed-   Recent Labs   Lab 08/25/22  0125 08/25/22  0556 08/25/22  1230   POCTGLUCOSE 92 111* 109     Current correctional scale  Low  Maintain anti-hyperglycemic dose as follows-   Antihyperglycemics (From admission, onward)            Start     Stop Route Frequency Ordered    08/25/22 0004  insulin aspart U-100 pen 0-5 Units         -- SubQ Every 6 hours PRN 08/24/22 2304        Hold Oral hypoglycemics while patient is in the hospital.  BS controlled    Renal lesion  CT abd showed 1.3 cm indeterminate superior pole left renal lesion.  F/u with urology        Aneurysm of infrarenal abdominal aorta  CT abd showed 3.4 cm infrarenal abdominal aortic aneurysm.   F/u with vascular surgery      Infection of PEG site  Continue doxycycline   Peg site care    Lactic acidosis  Likely 2/2 vomiting   IVFs  Antiemetics prn   Monitor     Resolved       Stroke  With right sided hemiparesis, aphasia, and dysphagia   Cont ASA, Statin   Supportive care        VTE Risk Mitigation (From admission, onward)         Ordered     enoxaparin injection 40 mg  Daily         08/24/22 2304     Place sequential compression device  Until discontinued         08/24/22 2304     IP VTE LOW RISK PATIENT  Once         08/24/22 2304     Place sequential compression device  Until discontinued         08/24/22 2304                Discharge Planning   VITOR:      Code Status: Full Code   Is the patient medically ready for discharge?:     Reason for patient still in hospital (select all that apply): Patient trending condition, Laboratory test and Treatment  Discharge Plan A: Return to nursing home                  Nadiya Fisher NP  Department of Hospital Medicine   [facility] - Med Surg

## 2022-08-25 NOTE — PLAN OF CARE
Pt remains free of falls/injury this shift. Safety precautions maintained. Pain managed with relaxation and nothing. No sxs of acute distress noted. Will continue to monitor.

## 2022-08-25 NOTE — SUBJECTIVE & OBJECTIVE
"Past Medical History:   Diagnosis Date    Aphasia     Dysphagia     Dysphagia     Hypertension     Mixed hyperlipidemia     Prostate cancer     Stroke     Type 2 diabetes mellitus with unspecified diabetic retinopathy without macular edema        Past Surgical History:   Procedure Laterality Date    GASTROSTOMY TUBE PLACEMENT      HERNIA REPAIR  12/01/2011    PROSTATECTOMY  01/01/2009       Review of patient's allergies indicates:   Allergen Reactions    Lisinopril Swelling       No current facility-administered medications on file prior to encounter.     Current Outpatient Medications on File Prior to Encounter   Medication Sig    aspirin (ECOTRIN) 81 MG EC tablet Take 1 tablet (81 mg total) by mouth once daily.    atorvastatin (LIPITOR) 40 MG tablet Take 40 mg by mouth.    baclofen (LIORESAL) 10 MG tablet Take 10 mg by mouth.    FLUoxetine 40 MG capsule Take 20 mg by mouth.    insulin (LANTUS SOLOSTAR U-100 INSULIN) glargine 100 units/mL SubQ pen Inject 17 Units into the skin 2 (two) times a day.    insulin NPH isoph U-100 human (NOVOLIN N FLEXPEN) 100 unit/mL (3 mL) InPn Inject into the skin 3 (three) times daily before meals.    amLODIPine (NORVASC) 10 MG tablet Take 1 tablet (10 mg total) by mouth once daily. (FOR BLOOD PRESSURE)    insulin aspart U-100 (NOVOLOG) 100 unit/mL (3 mL) InPn pen Inject into the skin.    oxybutynin (DITROPAN) 5 MG Tab Take 1 tablet (5 mg total) by mouth 3 (three) times daily. (Patient taking differently: Take 5 mg by mouth 2 (two) times daily.)    papaverine 30 mg/mL injection Inject 0.3 ml of trimix solution prn ED max once daily  Prepare 10ml of trimix solution containing:    Papaverine 30mg/ml    Phentolamine 1 mg/ml    Alprostadil 10mcg/ml  Dispense 10ml per refill  Qs syringes 1cc/30g/0.5" and alcohol swabs dispense as needed for Intracavernosal injection     Family History       Problem Relation (Age of Onset)    Cancer Sister    Coronary artery disease Brother    Diabetes " Sister    Prostate cancer Father (79)    Stroke Mother          Tobacco Use    Smoking status: Current Every Day Smoker     Packs/day: 0.50     Years: 35.00     Pack years: 17.50    Smokeless tobacco: Never Used   Substance and Sexual Activity    Alcohol use: Yes     Alcohol/week: 1.7 standard drinks     Types: 2 Standard drinks or equivalent per week    Drug use: No    Sexual activity: Yes     Partners: Female     Review of Systems   Unable to perform ROS: Patient nonverbal   Objective:     Vital Signs (Most Recent):  Temp: 98.4 °F (36.9 °C) (08/24/22 2202)  Pulse: 78 (08/24/22 2202)  Resp: (!) 23 (08/24/22 2102)  BP: (!) 156/96 (08/24/22 2202)  SpO2: 95 % (08/24/22 2202)   Vital Signs (24h Range):  Temp:  [98.4 °F (36.9 °C)-100.2 °F (37.9 °C)] 98.4 °F (36.9 °C)  Pulse:  [76-97] 78  Resp:  [18-24] 23  SpO2:  [95 %-98 %] 95 %  BP: (136-156)/(89-98) 156/96     Weight: 68.6 kg (151 lb 3.8 oz)  Body mass index is 23.69 kg/m².    Physical Exam  Vitals and nursing note reviewed.   Constitutional:       Appearance: He is well-developed.   HENT:      Head: Normocephalic and atraumatic.      Nose: Nose normal.   Eyes:      General: No scleral icterus.     Pupils: Pupils are equal, round, and reactive to light.   Cardiovascular:      Rate and Rhythm: Normal rate and regular rhythm.      Heart sounds: Normal heart sounds. No murmur heard.    No friction rub. No gallop.   Pulmonary:      Effort: Pulmonary effort is normal. No respiratory distress.      Breath sounds: Normal breath sounds. No wheezing.   Abdominal:      General: Bowel sounds are normal. There is no distension.      Palpations: Abdomen is soft.      Tenderness: There is no abdominal tenderness.      Comments: Scant blood tinged drainage at peg tube site    Musculoskeletal:         General: Deformity (right arm mild contracture- splint in place) present.      Cervical back: Normal range of motion and neck supple.   Skin:     General: Skin is warm and dry.       "Findings: No rash.   Neurological:      Mental Status: He is alert.      Motor: Weakness (right sided hemiparesis) present.      Comments: Mostly non-verbal-Only responds with "yes" as answers to questions   Aphasia    Psychiatric:         Behavior: Behavior normal.         CRANIAL NERVES     CN III, IV, VI   Pupils are equal, round, and reactive to light.     Significant Labs: All pertinent labs within the past 24 hours have been reviewed.    Significant Imaging: I have reviewed all pertinent imaging results/findings within the past 24 hours.  "

## 2022-08-25 NOTE — PLAN OF CARE
[facility] - Med Surg  Initial Discharge Assessment       Primary Care Provider: VIOLETTA Dawn MD    Admission Diagnosis: PEG tube malfunction [K94.23]  Vomiting, intractability of vomiting not specified, presence of nausea not specified, unspecified vomiting type [R11.10]    Admission Date: 8/24/2022  Expected Discharge Date:     Discharge Barriers Identified: None    Payor[facility] MEDICAID / [facility] / Product Type: Managed Medicaid /     Extended Emergency Contact Information  Primary Emergency Contact: Aldo White   [place]  Mobile Phone: [phone]  Relation: Mother  Secondary Emergency Contact: mahsa white  Mobile Phone: [phone]  Relation: Spouse  Preferred language: English   needed? No    Discharge Plan A: Return to nursing home  Discharge Plan B: Return to Nursing Home      [id number] - [facility] – [place], [place] - [address] AT [facility]  [address]  [facility] [address]  Phone: [phone] Fax: [phone]    [id number] - [place], [place] - [address] AT AT [facility]  [address]  [facility] [address]  Phone: [phone] Fax: [phone]      Initial Assessment (most recent)     Adult Discharge Assessment - 08/25/22 1012        Discharge Assessment    Assessment Type Discharge Planning Assessment     Confirmed/corrected address, phone number and insurance No     Reason No family to provide information/patient unable to answer     Source of Information facility verbal report     Communicated VITOR with patient/caregiver No     Reason For Admission SIRS, PEG tube problem     Lives With facility resident     Facility Arrived From: [facility]     Do you expect to return to your current living situation? Yes     Do you have help at home or someone to help you manage your care at home? Yes     Who are your caregiver(s) and their phone  number(s)? [facility] staff     Prior to hospitilization cognitive status: Unable to Assess     Current cognitive status: Unable to Assess     Walking or Climbing Stairs Difficulty ambulation difficulty, dependent     Dressing/Bathing Difficulty bathing difficulty, assistance 1 person     Home Accessibility wheelchair accessible     Home Layout Able to live on 1st floor     Equipment Currently Used at Home wheelchair     Readmission within 30 days? No     Patient currently being followed by outpatient case management? No     Do you currently have service(s) that help you manage your care at home? No     Do you take prescription medications? Yes     Do you have prescription coverage? Yes     Do you have any problems affording any of your prescribed medications? No     Is the patient taking medications as prescribed? yes     Who is going to help you get home at discharge? [facility] staff     How do you get to doctors appointments? --   [facility]    Are you on dialysis? No     Do you take coumadin? No     Discharge Plan A Return to nursing home     Discharge Plan B Return to Nursing Home     DME Needed Upon Discharge  none     Discharge Plan discussed with: Caregiver     Name(s) and Number(s) Solange at [facility]     Discharge Barriers Identified None        Relationship/Environment    Name(s) of Who Lives With Patient Facility resident               DEEPIKA attempted bedside assessment, however patient answers yes to all questions.  CM unable to complete the assessment with patient.  CM attempted to contact patient's mother, Aldo Car, @[phone] to complete the assessment.  CM had to leave a message.  DEEPIKA called [facility] and spoke to Solange to complete the assessment.  Patient is a resident at [facility].  He is wheelchair bound but Solange did state that patient does assist in transfers from bed to chair.  Patient receives nutrition/medications via PEG tube.  Patient will return to [facility] at discharge.   CM provided a transitional care folder, information on advanced directives, information on pharmacy bedside delivery, and discharge planning begins on admission with contact information for any needs/questions.

## 2022-08-25 NOTE — PLAN OF CARE
Nutrition Plan of Care:    Recommendation/Intervention:     1.  Recommended Diabetisource:   Goal rate of 60ml/hr with H2O flushes of 100mls q 6 hours  Begin tube feeding at a rate of 20ml/hr.    Increase by 10mls every 8 hours until goal rate of 60ml/hr is reached (as tolerated)     2. Collaboration with medical providers     Goals: Patient to tolerate adequate enteral nutrition prior to discharge.     Nutrition Goal Status: progressing towards goal     Communication of RD Recs: other (comment) (poc, sticky notes)    Mandy Pineda MS, RDN, LDN

## 2022-08-25 NOTE — ASSESSMENT & PLAN NOTE
Patient's FSGs are controlled on current medication regimen.  Last A1c reviewed-   Lab Results   Component Value Date    HGBA1C 5.5 08/24/2022     Most recent fingerstick glucose reviewed-   Recent Labs   Lab 08/25/22  0125 08/25/22  0556 08/25/22  1230   POCTGLUCOSE 92 111* 109     Current correctional scale  Low  Maintain anti-hyperglycemic dose as follows-   Antihyperglycemics (From admission, onward)            Start     Stop Route Frequency Ordered    08/25/22 0004  insulin aspart U-100 pen 0-5 Units         -- SubQ Every 6 hours PRN 08/24/22 2304        Hold Oral hypoglycemics while patient is in the hospital.  BS controlled

## 2022-08-25 NOTE — HOSPITAL COURSE
65 y/o male admitted for infection of peg tube site and started on IV cefepime and doxycycline. Labs reviewed and stable. Wound care consult. Pt's WBC count returned to normal. Wound care recommends normal PEG tube site care. Patient's labs and VS stable today. Will dc back to [facility] where he is a resident. He will take Augmentin via PEG X 10 days and f/u as OP with PCP. Patient was seen and examined today and deemed stable for discharge home.

## 2022-08-25 NOTE — SUBJECTIVE & OBJECTIVE
"    Review of Systems   Unable to perform ROS: Patient nonverbal   Objective:     Vital Signs (Most Recent):  Temp: 99.2 °F (37.3 °C) (08/25/22 1117)  Pulse: 92 (08/25/22 1117)  Resp: 18 (08/25/22 1117)  BP: 114/76 (08/25/22 1117)  SpO2: (!) 92 % (08/25/22 1117)   Vital Signs (24h Range):  Temp:  [98 °F (36.7 °C)-100.2 °F (37.9 °C)] 99.2 °F (37.3 °C)  Pulse:  [78-97] 92  Resp:  [17-24] 18  SpO2:  [91 %-98 %] 92 %  BP: (114-156)/(76-98) 114/76     Weight: 68.6 kg (151 lb 3.8 oz)  Body mass index is 23.69 kg/m².    Intake/Output Summary (Last 24 hours) at 8/25/2022 1254  Last data filed at 8/25/2022 0800  Gross per 24 hour   Intake 2358 ml   Output --   Net 2358 ml      Physical Exam  Vitals and nursing note reviewed.   Constitutional:       Appearance: He is well-developed.   HENT:      Head: Normocephalic and atraumatic.      Nose: Nose normal.   Eyes:      General: No scleral icterus.     Pupils: Pupils are equal, round, and reactive to light.   Cardiovascular:      Rate and Rhythm: Normal rate and regular rhythm.      Heart sounds: Normal heart sounds. No murmur heard.    No friction rub. No gallop.   Pulmonary:      Effort: Pulmonary effort is normal. No respiratory distress.      Breath sounds: Normal breath sounds. No wheezing.   Abdominal:      General: Bowel sounds are normal. There is no distension.      Palpations: Abdomen is soft.      Tenderness: There is no abdominal tenderness.      Comments: Scant blood tinged drainage at peg tube site    Musculoskeletal:         General: Deformity (right arm mild contracture- splint in place) present.      Cervical back: Normal range of motion and neck supple.   Skin:     General: Skin is warm and dry.      Findings: No rash.   Neurological:      Mental Status: He is alert.      Motor: Weakness (right sided hemiparesis) present.      Comments: Mostly non-verbal-Only responds with "yes" as answers to questions   Aphasia    Psychiatric:         Behavior: Behavior " normal.       Significant Labs: All pertinent labs within the past 24 hours have been reviewed.  Blood Culture:   Recent Labs   Lab 08/24/22  1900 08/24/22  1907   LABBLOO No Growth to date No Growth to date     BMP:   Recent Labs   Lab 08/25/22  0559   *      K 4.2      CO2 23   BUN 14   CREATININE 0.8   CALCIUM 8.8     CBC:   Recent Labs   Lab 08/24/22 1829 08/25/22  0559   WBC 13.56* 10.66   HGB 19.2* 16.7   HCT 56.0* 49.5    141*     CMP:   Recent Labs   Lab 08/24/22 1829 08/25/22  0559    137   K 4.6 4.2    105   CO2 22* 23    116*   BUN 14 14   CREATININE 0.8 0.8   CALCIUM 9.9 8.8   PROT 8.7* 6.5   ALBUMIN 3.9 3.2*   BILITOT 0.6 0.6   ALKPHOS 140* 110   AST 51* 37   ALT 95* 69*   ANIONGAP 14 9     Urine Studies: No results for input(s): COLORU, APPEARANCEUA, PHUR, SPECGRAV, PROTEINUA, GLUCUA, KETONESU, BILIRUBINUA, OCCULTUA, NITRITE, UROBILINOGEN, LEUKOCYTESUR, RBCUA, WBCUA, BACTERIA, SQUAMEPITHEL, HYALINECASTS in the last 48 hours.    Invalid input(s): WRIGHTSUR    Significant Imaging: I have reviewed all pertinent imaging results/findings within the past 24 hours.

## 2022-08-25 NOTE — HPI
The patient is a 67 yo male nursing home resident with hx Stroke with right sided hemiparesis, Aphasia, Dysphagia s/p PEG placement, Prostate cancer, type II DM, HTN, and Hyperlipidemia who was sent to ED from Nursing home due to minor bleeding from PEG tube site and vomiting- onset today. Pt was seen pulling at PEG tube and there was concern for displacement. While in the ED, the pt was noted to have elevated WBC count and Lactic acid as well as a low grade fever.  was notified.     In the ED, Temp 100.4F, WBC 13, Hgb 19, LA 3.4, Procalcitonin normal.   CXR showed Bibasilar opacities possibly related to interstitial lung disease.  Edema or early infection difficult to exclude but less favored.   CT abd showed G-tube in place in the stomach, Mild inflammatory stranding about the gastrostomy tube at the level of the skin surface,1.3 cm indeterminate superior pole left renal lesion, Bibasilar posterior dependent densities favored over aspiration or atelectasis, 3.4 cm infrarenal abdominal aortic aneurysm.   UA pending    The patient will be placed in observation under hospital medicine

## 2022-08-25 NOTE — ASSESSMENT & PLAN NOTE
Low grade temp, mild leukocytosis   Possible PNA on CXR  CT abd showed Mild stranding around peg site    UA pending   Empiric IV Cefepime and Doxycycline via PEG  Blood cultures

## 2022-08-25 NOTE — H&P
[facility] Medicine  History & Physical    Patient Name: Gunner Estes  MRN: [id number]  Patient Class: OP- Observation  Admission Date: 8/24/2022  Attending Physician: Dr. Orozco  Primary Care Provider: VIOLETTA Dawn MD         Patient information was obtained from past medical records and ER records.     Subjective:     Principal Problem:SIRS (systemic inflammatory response syndrome)    Chief Complaint:   Chief Complaint   Patient presents with    peg tub issue      Pt. Presents to ED via [facility] due to his PEG tube having minor bleeding noted. PEG tube is still in.         HPI: The patient is a 65 yo male nursing home resident with hx Stroke with right sided hemiparesis, Aphasia, Dysphagia s/p PEG placement, Prostate cancer, type II DM, HTN, and Hyperlipidemia who was sent to ED from Nursing home due to minor bleeding from PEG tube site and vomiting- onset today. Pt was seen pulling at PEG tube and there was concern for displacement. While in the ED, the pt was noted to have elevated WBC count and Lactic acid as well as a low grade fever. HM was notified.     In the ED, Temp 100.4F, WBC 13, Hgb 19, LA 3.4, Procalcitonin normal.   CXR showed Bibasilar opacities possibly related to interstitial lung disease.  Edema or early infection difficult to exclude but less favored.   CT abd showed G-tube in place in the stomach, Mild inflammatory stranding about the gastrostomy tube at the level of the skin surface,1.3 cm indeterminate superior pole left renal lesion, Bibasilar posterior dependent densities favored over aspiration or atelectasis, 3.4 cm infrarenal abdominal aortic aneurysm.   UA pending    The patient will be placed in observation under hospital medicine       Past Medical History:   Diagnosis Date    Aphasia     Dysphagia     Dysphagia     Hypertension     Mixed hyperlipidemia     Prostate cancer     Stroke     Type 2 diabetes mellitus with unspecified diabetic retinopathy without  "macular edema        Past Surgical History:   Procedure Laterality Date    GASTROSTOMY TUBE PLACEMENT      HERNIA REPAIR  12/01/2011    PROSTATECTOMY  01/01/2009       Review of patient's allergies indicates:   Allergen Reactions    Lisinopril Swelling       No current facility-administered medications on file prior to encounter.     Current Outpatient Medications on File Prior to Encounter   Medication Sig    aspirin (ECOTRIN) 81 MG EC tablet Take 1 tablet (81 mg total) by mouth once daily.    atorvastatin (LIPITOR) 40 MG tablet Take 40 mg by mouth.    baclofen (LIORESAL) 10 MG tablet Take 10 mg by mouth.    FLUoxetine 40 MG capsule Take 20 mg by mouth.    insulin (LANTUS SOLOSTAR U-100 INSULIN) glargine 100 units/mL SubQ pen Inject 17 Units into the skin 2 (two) times a day.    insulin NPH isoph U-100 human (NOVOLIN N FLEXPEN) 100 unit/mL (3 mL) InPn Inject into the skin 3 (three) times daily before meals.    amLODIPine (NORVASC) 10 MG tablet Take 1 tablet (10 mg total) by mouth once daily. (FOR BLOOD PRESSURE)    insulin aspart U-100 (NOVOLOG) 100 unit/mL (3 mL) InPn pen Inject into the skin.    oxybutynin (DITROPAN) 5 MG Tab Take 1 tablet (5 mg total) by mouth 3 (three) times daily. (Patient taking differently: Take 5 mg by mouth 2 (two) times daily.)    papaverine 30 mg/mL injection Inject 0.3 ml of trimix solution prn ED max once daily  Prepare 10ml of trimix solution containing:    Papaverine 30mg/ml    Phentolamine 1 mg/ml    Alprostadil 10mcg/ml  Dispense 10ml per refill  Qs syringes 1cc/30g/0.5" and alcohol swabs dispense as needed for Intracavernosal injection     Family History       Problem Relation (Age of Onset)    Cancer Sister    Coronary artery disease Brother    Diabetes Sister    Prostate cancer Father (79)    Stroke Mother          Tobacco Use    Smoking status: Current Every Day Smoker     Packs/day: 0.50     Years: 35.00     Pack years: 17.50    Smokeless tobacco: Never Used " "  Substance and Sexual Activity    Alcohol use: Yes     Alcohol/week: 1.7 standard drinks     Types: 2 Standard drinks or equivalent per week    Drug use: No    Sexual activity: Yes     Partners: Female     Review of Systems   Unable to perform ROS: Patient nonverbal   Objective:     Vital Signs (Most Recent):  Temp: 98.4 °F (36.9 °C) (08/24/22 2202)  Pulse: 78 (08/24/22 2202)  Resp: (!) 23 (08/24/22 2102)  BP: (!) 156/96 (08/24/22 2202)  SpO2: 95 % (08/24/22 2202)   Vital Signs (24h Range):  Temp:  [98.4 °F (36.9 °C)-100.2 °F (37.9 °C)] 98.4 °F (36.9 °C)  Pulse:  [76-97] 78  Resp:  [18-24] 23  SpO2:  [95 %-98 %] 95 %  BP: (136-156)/(89-98) 156/96     Weight: 68.6 kg (151 lb 3.8 oz)  Body mass index is 23.69 kg/m².    Physical Exam  Vitals and nursing note reviewed.   Constitutional:       Appearance: He is well-developed.   HENT:      Head: Normocephalic and atraumatic.      Nose: Nose normal.   Eyes:      General: No scleral icterus.     Pupils: Pupils are equal, round, and reactive to light.   Cardiovascular:      Rate and Rhythm: Normal rate and regular rhythm.      Heart sounds: Normal heart sounds. No murmur heard.    No friction rub. No gallop.   Pulmonary:      Effort: Pulmonary effort is normal. No respiratory distress.      Breath sounds: Normal breath sounds. No wheezing.   Abdominal:      General: Bowel sounds are normal. There is no distension.      Palpations: Abdomen is soft.      Tenderness: There is no abdominal tenderness.      Comments: Scant blood tinged drainage at peg tube site    Musculoskeletal:         General: Deformity (right arm mild contracture- splint in place) present.      Cervical back: Normal range of motion and neck supple.   Skin:     General: Skin is warm and dry.      Findings: No rash.   Neurological:      Mental Status: He is alert.      Motor: Weakness (right sided hemiparesis) present.      Comments: Mostly non-verbal-Only responds with "yes" as answers to questions "   Aphasia    Psychiatric:         Behavior: Behavior normal.         CRANIAL NERVES     CN III, IV, VI   Pupils are equal, round, and reactive to light.     Significant Labs: All pertinent labs within the past 24 hours have been reviewed.    Significant Imaging: I have reviewed all pertinent imaging results/findings within the past 24 hours.    Assessment/Plan:     * SIRS (systemic inflammatory response syndrome)  Low grade temp, mild leukocytosis   Possible PNA on CXR  CT abd showed Mild stranding around peg site    UA pending   Empiric IV Cefepime and Doxycycline via PEG  Blood cultures    Lactic acidosis  Likely 2/2 vomiting   IVFs  Antiemetics prn   Monitor       Infection of PEG site  Doxycycline via peg  Peg site care    Polycythemia   Likely 2/2 dehydration   IVFs  Monitor     Diabetes  Patient's FSGs are controlled on current medication regimen.  Last A1c reviewed-   Lab Results   Component Value Date    HGBA1C 6.2 07/03/2020     Most recent fingerstick glucose reviewed- No results for input(s): POCTGLUCOSE in the last 24 hours.  Current correctional scale  Low  Maintain anti-hyperglycemic dose as follows-   Antihyperglycemics (From admission, onward)            Start     Stop Route Frequency Ordered    08/25/22 0004  insulin aspart U-100 pen 0-5 Units         -- SubQ Every 6 hours PRN 08/24/22 2304        Hold Oral hypoglycemics while patient is in the hospital.    Renal lesion  CT abd showed 1.3 cm indeterminate superior pole left renal lesion.  F/u with urology       Aneurysm of infrarenal abdominal aorta  CT abd showed 3.4 cm infrarenal abdominal aortic aneurysm.   F/u with vascular surgery      Stroke  With right sided hemiparesis, aphasia, and dysphagia   Cont ASA, Statin   Supportive care        VTE Risk Mitigation (From admission, onward)         Ordered     enoxaparin injection 40 mg  Daily         08/24/22 2304     Place sequential compression device  Until discontinued         08/24/22 2304     IP  VTE LOW RISK PATIENT  Once         08/24/22 2304     Place sequential compression device  Until discontinued         08/24/22 2304                   Solange Solorio NP  Department of Hospital Medicine   '[facility] Surg

## 2022-08-25 NOTE — PROGRESS NOTES
[facility] - Med Surg  Adult Nutrition  Progress Note    SUMMARY       Recommendations    Recommendation/Intervention:   1.  Recommended Diabetisource: Goal rate of 60ml/hr with H2O flushes of 100mls q 6 hours.    2. Collaboration with medical providers    Goals: Patient to tolerate adequate enteral nutrition prior to discharge.    Nutrition Goal Status: progressing towards goal    Communication of RD Recs: other (comment) (poc, sticky notes)    Assessment and Plan    Nutrition Problem  Inability to consume oral intake     Related to (etiology):   Chewing and swallowing difficulties     Signs and Symptoms (as evidenced by):   PEG tube dependent     Interventions/Recommendations (treatment strategy):  1.  Recommended Diabetisource: Goal rate of 60ml/hr with H2O flushes of 100mls q 6 hours.    2. Collaboration with medical providers    Nutrition Diagnosis Status:   Continues         Malnutrition Assessment                                       Reason for Assessment    Reason For Assessment: new tube feeding    Diagnosis: diabetes diagnosis/complications, infection/sepsis  Patient Active Problem List   Diagnosis    History of prostate cancer    Essential hypertension    Chronic bilateral low back pain with bilateral sciatica    History of transient ischemic attack (TIA)    Abnormal glucose    Absence of bladder continence    SIRS (systemic inflammatory response syndrome)    Stroke    Lactic acidosis    Infection of PEG site    Aneurysm of infrarenal abdominal aorta    Renal lesion    Diabetes    Polycythemia     Past Medical History:   Diagnosis Date    Aphasia     Dysphagia     Dysphagia     Hypertension     Mixed hyperlipidemia     Prostate cancer     Stroke     Type 2 diabetes mellitus with unspecified diabetic retinopathy without macular edema        Interdisciplinary Rounds: did not attend    General Information Comments:   8/25:Patient with PEG and diabetic tube feeding formula ordered.  PEG  "site bleeding at time of admit.  RD made tube feeding recommendations in chart.  NKFA.  No LBM noted.  RD to continue to monitor patient's tolerance.  (RD remote)  Wt Readings from Last 6 Encounters:   08/25/22 68.6 kg (151 lb 3.8 oz)   01/11/19 63.3 kg (139 lb 8.8 oz)   10/23/18 62 kg (136 lb 11 oz)   10/22/18 62 kg (136 lb 11 oz)   04/24/13 66.9 kg (147 lb 9 oz)   11/28/12 68.9 kg (152 lb)       Nutrition Discharge Planning: Patient to tolerate adequate alternate source of nutrition prior to discharge.    Nutrition Risk Screen    Nutrition Risk Screen: dysphagia or difficulty swallowing, tube feeding or parenteral nutrition    Nutrition/Diet History    Spiritual, Cultural Beliefs, Cheondoism Practices, Values that Affect Care: no  Food Allergies: NKFA  Factors Affecting Nutritional Intake: NPO  Nutrition Support Formula Prior to Admit: Glucerna 1.5  Nutrition Support Rate Prior to Admit: 50 (ml)    Anthropometrics    Temp: 99.2 °F (37.3 °C)  Height Method: Estimated  Height: 5' 7" (170.2 cm)  Height (inches): 67 in  Weight Method: Bed Scale  Weight: 68.6 kg (151 lb 3.8 oz)  Weight (lb): 151.24 lb  Ideal Body Weight (IBW), Male: 148 lb  % Ideal Body Weight, Male (lb): 102.19 %  BMI (Calculated): 23.7  BMI Grade: 18.5-24.9 - normal       Lab/Procedures/Meds    Pertinent Labs Reviewed: reviewed  Pertinent Medications Reviewed: reviewed  Lab Results   Component Value Date    HGBA1C 5.5 08/24/2022     BMP  Lab Results   Component Value Date     08/25/2022    K 4.2 08/25/2022     08/25/2022    CO2 23 08/25/2022    BUN 14 08/25/2022    CREATININE 0.8 08/25/2022    CALCIUM 8.8 08/25/2022    ANIONGAP 9 08/25/2022    ESTGFRAFRICA >60.0 01/11/2019    EGFRNONAA >60.0 01/11/2019     Lab Results   Component Value Date     08/25/2022    K 4.2 08/25/2022     08/25/2022    CO2 23 08/25/2022     Lab Results   Component Value Date    CREATININE 0.8 08/25/2022    BUN 14 08/25/2022     08/25/2022    K 4.2 " 08/25/2022     08/25/2022    CO2 23 08/25/2022     Lab Results   Component Value Date    CALCIUM 8.8 08/25/2022     Lab Results   Component Value Date    ALBUMIN 3.2 (L) 08/25/2022     Scheduled Meds:   amLODIPine  10 mg Per G Tube Daily    aspirin  81 mg Per G Tube Daily    atorvastatin  40 mg Per G Tube Daily    baclofen  10 mg Per G Tube QHS    ceFEPime (MAXIPIME) IVPB  2 g Intravenous Q8H    doxycycline  100 mg Per G Tube Q12H    enoxaparin  40 mg Subcutaneous Daily    FLUoxetine  40 mg Per G Tube Daily     Continuous Infusions:  PRN Meds:.dextrose 10%, dextrose 10%, glucagon (human recombinant), glucose, glucose, insulin aspart U-100, naloxone, ondansetron, sodium chloride 0.9%    Physical Findings/Assessment         Estimated/Assessed Needs    Weight Used For Calorie Calculations: 68.6 kg (151 lb 3.8 oz)  Energy Calorie Requirements (kcal): 25-30kcals/kg  Energy Need Method: Kcal/kg  Protein Requirements: 1.2g/kg  Weight Used For Protein Calculations: 68.6 kg (151 lb 3.8 oz)  Fluid Requirements (mL): 2557-5250  Estimated Fluid Requirement Method: RDA Method  RDA Method (mL): 25  CHO Requirement: 214-257      Nutrition Prescription Ordered    Current Diet Order: npo  Current Nutrition Support Formula Ordered: Glucerna 1.5  Current Nutrition Support Rate Ordered: 50 (ml)    Evaluation of Received Nutrient/Fluid Intake    % Kcal Needs: 80%  % Protein Needs: 80%  Energy Calories Required: meeting needs  Protein Required: meeting needs  Fluid Required: meeting needs  Tolerance: tolerating  % Intake of Estimated Energy Needs: 75 - 100 %  % Meal Intake: NPO  Intake/Output - Last 3 Shifts       08/23 0700 08/24 0659 08/24 0700 08/25 0659 08/25 0700 08/26 0659    P.O.   0    NG/GT  200     IV Piggyback  2158     Total Intake(mL/kg)  2358 (34.4) 0 (0)    Net  +2358 0           Urine Occurrence   1 x          Nutrition Risk    Level of Risk/Frequency of Follow-up: moderate     Monitor and  Evaluation    Food and Nutrient Intake: enteral nutrition intake  Food and Nutrient Adminstration: enteral and parenteral nutrition administration  Knowledge/Beliefs/Attitudes: beliefs and attitudes  Physical Activity and Function: nutrition-related ADLs and IADLs, factors affecting access to physical activity  Anthropometric Measurements: height/length, weight, weight change, body mass index  Biochemical Data, Medical Tests and Procedures: inflammatory profile, glucose/endocrine profile, gastrointestinal profile, electrolyte and renal panel, lipid profile     Nutrition Follow-Up    RD Follow-up?: Yes

## 2022-08-25 NOTE — ASSESSMENT & PLAN NOTE
Patient's FSGs are controlled on current medication regimen.  Last A1c reviewed-   Lab Results   Component Value Date    HGBA1C 6.2 07/03/2020     Most recent fingerstick glucose reviewed- No results for input(s): POCTGLUCOSE in the last 24 hours.  Current correctional scale  Low  Maintain anti-hyperglycemic dose as follows-   Antihyperglycemics (From admission, onward)            Start     Stop Route Frequency Ordered    08/25/22 0004  insulin aspart U-100 pen 0-5 Units         -- SubQ Every 6 hours PRN 08/24/22 2304        Hold Oral hypoglycemics while patient is in the hospital.

## 2022-08-25 NOTE — ASSESSMENT & PLAN NOTE
Low grade temp, mild leukocytosis   Possible PNA on CXR  CT abd showed Mild stranding around peg site    UA pending   Empiric IV Cefepime and Doxycycline via PEG  Blood cultures    WBCs and Lactic acid back to baseline  Afebrile   Blood cx pending

## 2022-08-26 VITALS
WEIGHT: 151.25 LBS | HEART RATE: 78 BPM | OXYGEN SATURATION: 95 % | RESPIRATION RATE: 18 BRPM | TEMPERATURE: 98 F | BODY MASS INDEX: 23.74 KG/M2 | HEIGHT: 67 IN | DIASTOLIC BLOOD PRESSURE: 79 MMHG | SYSTOLIC BLOOD PRESSURE: 128 MMHG

## 2022-08-26 LAB
ALBUMIN SERPL BCP-MCNC: 3.2 G/DL (ref 3.5–5.2)
ALP SERPL-CCNC: 111 U/L (ref 55–135)
ALT SERPL W/O P-5'-P-CCNC: 53 U/L (ref 10–44)
ANION GAP SERPL CALC-SCNC: 9 MMOL/L (ref 8–16)
AST SERPL-CCNC: 27 U/L (ref 10–40)
BILIRUB SERPL-MCNC: 0.4 MG/DL (ref 0.1–1)
BUN SERPL-MCNC: 14 MG/DL (ref 8–23)
CALCIUM SERPL-MCNC: 9 MG/DL (ref 8.7–10.5)
CHLORIDE SERPL-SCNC: 108 MMOL/L (ref 95–110)
CO2 SERPL-SCNC: 23 MMOL/L (ref 23–29)
CREAT SERPL-MCNC: 0.7 MG/DL (ref 0.5–1.4)
ERYTHROCYTE [DISTWIDTH] IN BLOOD BY AUTOMATED COUNT: 14 % (ref 11.5–14.5)
EST. GFR  (NO RACE VARIABLE): >60 ML/MIN/1.73 M^2
GLUCOSE SERPL-MCNC: 115 MG/DL (ref 70–110)
HCT VFR BLD AUTO: 48.1 % (ref 40–54)
HGB BLD-MCNC: 16.3 G/DL (ref 14–18)
MCH RBC QN AUTO: 30.2 PG (ref 27–31)
MCHC RBC AUTO-ENTMCNC: 33.9 G/DL (ref 32–36)
MCV RBC AUTO: 89 FL (ref 82–98)
PLATELET # BLD AUTO: 131 K/UL (ref 150–450)
PMV BLD AUTO: 13 FL (ref 9.2–12.9)
POCT GLUCOSE: 115 MG/DL (ref 70–110)
POCT GLUCOSE: 116 MG/DL (ref 70–110)
POCT GLUCOSE: 117 MG/DL (ref 70–110)
POTASSIUM SERPL-SCNC: 4.2 MMOL/L (ref 3.5–5.1)
PROT SERPL-MCNC: 6.6 G/DL (ref 6–8.4)
RBC # BLD AUTO: 5.4 M/UL (ref 4.6–6.2)
SODIUM SERPL-SCNC: 140 MMOL/L (ref 136–145)
WBC # BLD AUTO: 8.05 K/UL (ref 3.9–12.7)

## 2022-08-26 PROCEDURE — 36415 COLL VENOUS BLD VENIPUNCTURE: CPT | Performed by: NURSE PRACTITIONER

## 2022-08-26 PROCEDURE — 85027 COMPLETE CBC AUTOMATED: CPT | Performed by: NURSE PRACTITIONER

## 2022-08-26 PROCEDURE — 80053 COMPREHEN METABOLIC PANEL: CPT | Performed by: NURSE PRACTITIONER

## 2022-08-26 PROCEDURE — 63600175 PHARM REV CODE 636 W HCPCS: Performed by: NURSE PRACTITIONER

## 2022-08-26 PROCEDURE — 25000003 PHARM REV CODE 250: Performed by: NURSE PRACTITIONER

## 2022-08-26 PROCEDURE — G0378 HOSPITAL OBSERVATION PER HR: HCPCS

## 2022-08-26 PROCEDURE — 96366 THER/PROPH/DIAG IV INF ADDON: CPT

## 2022-08-26 RX ORDER — AMOXICILLIN AND CLAVULANATE POTASSIUM 875; 125 MG/1; MG/1
1 TABLET, FILM COATED ORAL 2 TIMES DAILY
Qty: 20 TABLET | Refills: 0
Start: 2022-08-26 | End: 2022-09-05

## 2022-08-26 RX ADMIN — CEFEPIME HYDROCHLORIDE 2 G: 2 INJECTION, SOLUTION INTRAVENOUS at 07:08

## 2022-08-26 RX ADMIN — FLUOXETINE 40 MG: 20 CAPSULE ORAL at 09:08

## 2022-08-26 RX ADMIN — AMLODIPINE BESYLATE 10 MG: 10 TABLET ORAL at 09:08

## 2022-08-26 RX ADMIN — CEFEPIME HYDROCHLORIDE 2 G: 2 INJECTION, SOLUTION INTRAVENOUS at 12:08

## 2022-08-26 RX ADMIN — ATORVASTATIN CALCIUM 40 MG: 40 TABLET, FILM COATED ORAL at 09:08

## 2022-08-26 RX ADMIN — ASPIRIN 81 MG CHEWABLE TABLET 81 MG: 81 TABLET CHEWABLE at 09:08

## 2022-08-26 RX ADMIN — DOXYCYCLINE HYCLATE 100 MG: 100 TABLET, COATED ORAL at 09:08

## 2022-08-26 NOTE — NURSING
Report given to Keysha hussein, IV access discontinued. Tele monitor discontinued. Pt ready for discharge.

## 2022-08-26 NOTE — DISCHARGE SUMMARY
[facility] Medicine  Discharge Summary      Patient Name: Gunner Estes  MRN: [id number]  Patient Class: OP- Observation  Admission Date: 8/24/2022  Hospital Length of Stay: 0 days  Discharge Date and Time: 8/26/2022  4:05 PM  Attending Physician: No att. providers found   Discharging Provider: PRANAV AragonP-C  Primary Care Provider: VIOLETTA Dawn MD      HPI:   The patient is a 65 yo male nursing home resident with hx Stroke with right sided hemiparesis, Aphasia, Dysphagia s/p PEG placement, Prostate cancer, type II DM, HTN, and Hyperlipidemia who was sent to ED from Nursing home due to minor bleeding from PEG tube site and vomiting- onset today. Pt was seen pulling at PEG tube and there was concern for displacement. While in the ED, the pt was noted to have elevated WBC count and Lactic acid as well as a low grade fever. HM was notified.     In the ED, Temp 100.4F, WBC 13, Hgb 19, LA 3.4, Procalcitonin normal.   CXR showed Bibasilar opacities possibly related to interstitial lung disease.  Edema or early infection difficult to exclude but less favored.   CT abd showed G-tube in place in the stomach, Mild inflammatory stranding about the gastrostomy tube at the level of the skin surface,1.3 cm indeterminate superior pole left renal lesion, Bibasilar posterior dependent densities favored over aspiration or atelectasis, 3.4 cm infrarenal abdominal aortic aneurysm.   UA pending    The patient will be placed in observation under hospital medicine       * No surgery found *      Hospital Course:   67 y/o male admitted for infection of peg tube site and started on IV cefepime and doxycycline. Labs reviewed and stable. Wound care consult. Pt's WBC count returned to normal. Wound care recommends normal PEG tube site care. Patient's labs and VS stable today. Will dc back to [facility] where he is a resident. He will take Augmentin via PEG X 10 days and f/u as OP with PCP. Patient was  seen and examined today and deemed stable for discharge home.       Goals of Care Treatment Preferences:  Code Status: Full Code      Consults:     No new Assessment & Plan notes have been filed under this hospital service since the last note was generated.  Service: Hospital Medicine    Final Active Diagnoses:    Diagnosis Date Noted POA    PRINCIPAL PROBLEM:  SIRS (systemic inflammatory response syndrome) [R65.10] 08/24/2022 Yes    Polycythemia [D75.1] 08/25/2022 Yes    Stroke [I63.9] 08/24/2022 Yes    Lactic acidosis [E87.2] 08/24/2022 Yes    Infection of PEG site [K94.22] 08/24/2022 Yes    Aneurysm of infrarenal abdominal aorta [I71.4] 08/24/2022 Yes    Renal lesion [N28.9] 08/24/2022 Yes    Diabetes [E11.9] 08/24/2022 Yes      Problems Resolved During this Admission:       Discharged Condition: stable    Disposition: Home or Self Care    Follow Up:   Follow-up Information     VIOLETTA Dawn MD Follow up in 3 day(s).    Specialty: Family Medicine  Why: hospital follow up  Contact information:  [address]  [place] [address]  [phone]                       Patient Instructions:      Diet NPO     Notify your health care provider if you experience any of the following:  increased confusion or weakness     Notify your health care provider if you experience any of the following:  worsening rash     Notify your health care provider if you experience any of the following:  persistent dizziness, light-headedness, or visual disturbances     Notify your health care provider if you experience any of the following:  severe persistent headache     Notify your health care provider if you experience any of the following:  difficulty breathing or increased cough     Notify your health care provider if you experience any of the following:  redness, tenderness, or signs of infection (pain, swelling, redness, odor or green/yellow discharge around incision site)     Notify your health care provider if you  experience any of the following:  severe uncontrolled pain     Notify your health care provider if you experience any of the following:  persistent nausea and vomiting or diarrhea     Notify your health care provider if you experience any of the following:  temperature >100.4     Change dressing (specify)   Order Comments: Routine PEG site care     Activity as tolerated       Significant Diagnostic Studies: Labs:   BMP:   Recent Labs   Lab 08/24/22 1829 08/25/22  0559 08/26/22  0552    116* 115*    137 140   K 4.6 4.2 4.2    105 108   CO2 22* 23 23   BUN 14 14 14   CREATININE 0.8 0.8 0.7   CALCIUM 9.9 8.8 9.0   , CMP   Recent Labs   Lab 08/24/22 1829 08/25/22  0559 08/26/22  0552    137 140   K 4.6 4.2 4.2    105 108   CO2 22* 23 23    116* 115*   BUN 14 14 14   CREATININE 0.8 0.8 0.7   CALCIUM 9.9 8.8 9.0   PROT 8.7* 6.5 6.6   ALBUMIN 3.9 3.2* 3.2*   BILITOT 0.6 0.6 0.4   ALKPHOS 140* 110 111   AST 51* 37 27   ALT 95* 69* 53*   ANIONGAP 14 9 9   , CBC   Recent Labs   Lab 08/24/22 1829 08/25/22  0559 08/26/22  0552   WBC 13.56* 10.66 8.05   HGB 19.2* 16.7 16.3   HCT 56.0* 49.5 48.1    141* 131*    and All labs within the past 24 hours have been reviewed    Pending Diagnostic Studies:     Procedure Component Value Units Date/Time    Urinalysis [[id number]] Collected: 08/25/22 2228    Order Status: Sent Lab Status: In process Updated: 08/25/22 2228    Specimen: Urine          Medications:  Reconciled Home Medications:      Medication List      START taking these medications    amoxicillin-clavulanate 875-125mg 875-125 mg per tablet  Commonly known as: AUGMENTIN  1 tablet by Per G Tube route 2 (two) times daily. for 10 days        CONTINUE taking these medications    amLODIPine 10 MG tablet  Commonly known as: NORVASC  Take 1 tablet (10 mg total) by mouth once daily. (FOR BLOOD PRESSURE)     aspirin 81 MG EC tablet  Commonly known as: ECOTRIN  Take 1 tablet (81 mg total)  "by mouth once daily.     atorvastatin 40 MG tablet  Commonly known as: LIPITOR  Take 40 mg by mouth.     baclofen 10 MG tablet  Commonly known as: LIORESAL  Take 10 mg by mouth.     FLUoxetine 40 MG capsule  Take 20 mg by mouth.     LANTUS SOLOSTAR U-100 INSULIN glargine 100 units/mL SubQ pen  Generic drug: insulin  Inject 17 Units into the skin 2 (two) times a day.     NovoLIN N Flexpen 100 unit/mL (3 mL) Inpn  Generic drug: insulin NPH isoph U-100 human  Inject into the skin 3 (three) times daily before meals.     papaverine 30 mg/mL injection  Inject 0.3 ml of trimix solution prn ED max once daily  Prepare 10ml of trimix solution containing:    Papaverine 30mg/ml    Phentolamine 1 mg/ml    Alprostadil 10mcg/ml  Dispense 10ml per refill  Qs syringes 1cc/30g/0.5" and alcohol swabs dispense as needed for Intracavernosal injection        STOP taking these medications    insulin aspart U-100 100 unit/mL (3 mL) Inpn pen  Commonly known as: NovoLOG     oxybutynin 5 MG Tab  Commonly known as: DITROPAN            Indwelling Lines/Drains at time of discharge:   Lines/Drains/Airways     Drain  Duration                Gastrostomy/Enterostomy Percutaneous endoscopic gastrostomy (PEG) midline -- days    Male External Urinary Catheter 08/25/22 1720 Medium <1 day                Time spent on the discharge of patient: 55 minutes         SIMIN Aragon-C  Department of Hospital Medicine  [facility]  "

## 2022-08-26 NOTE — PLAN OF CARE
Patient remains free of falls and injuries during shift. No signs of pain or discomfort until attempting to move. Managed with relaxation. IV antibiotics running as ordered. Tube feeding running. Telemonitoring in place. Blood glucose monitoring. Will continue to monitor.

## 2022-08-26 NOTE — PLAN OF CARE
[facility] - Med Surg  Discharge Final Note    Primary Care Provider: VIOLETTA Dawn MD    Expected Discharge Date: 8/26/2022    Final Discharge Note (most recent)     Final Note - 08/26/22 1312        Final Note    Assessment Type Final Discharge Note     Anticipated Discharge Disposition [facility]mediate Care Facility for senior care or Supportive Care                 Important Message from Medicare             Contact Info     VIOLETTA Dawn MD   Specialty: Family Medicine   Relationship: PCP - General    [address] [place] [id number]   Phone: [phone]       Next Steps: Follow up in 3 day(s)    Instructions: hospital follow up        DC Disposition: [facility]  Family Notified: Facility notified, mother did not answer  Transportation: [facility] will arrange transport  Nurse provided with phone number for report: [phone]  All DC information uploaded in careport: yes  Packet: with blue folder    Magy SHIPLEY RN

## 2022-08-26 NOTE — CONSULTS
[facility] Surg  Wound Care    Patient Name:  Gunner Estes   MRN:  [id number]  Date: 8/26/2022  Diagnosis: SIRS (systemic inflammatory response syndrome)    History:     Past Medical History:   Diagnosis Date    Aphasia     Dysphagia     Dysphagia     Hypertension     Mixed hyperlipidemia     Prostate cancer     Stroke     Type 2 diabetes mellitus with unspecified diabetic retinopathy without macular edema        Social History     Socioeconomic History    Marital status: Single   Tobacco Use    Smoking status: Current Every Day Smoker     Packs/day: 0.50     Years: 35.00     Pack years: 17.50    Smokeless tobacco: Never Used   Substance and Sexual Activity    Alcohol use: Yes     Alcohol/week: 1.7 standard drinks     Types: 2 Standard drinks or equivalent per week    Drug use: No    Sexual activity: Yes     Partners: Female       Precautions:     Allergies as of 08/24/2022 - Reviewed 08/24/2022   Allergen Reaction Noted    Lisinopril Swelling 10/22/2018       [facility] Assessment Details/Treatment     Consulted to this 66 year old male patient for peg tube altered skin integrity. hx Stroke with right sided hemiparesis, Aphasia, Dysphagia s/p PEG placement, Prostate cancer, type II DM, HTN, and Hyperlipidemia who was sent to ED from Nursing home due to minor bleeding from PEG tube site and vomiting- onset today. He is awake and alert, assessment performed. Peg tube site with hypergranulation. Silver nitrated. Mild MASD to peritube area. All gently cleansed with saline. Moisture barrier applied thenn padded and secured with split gauze and tape. Will follow. Recommend routine peg tube site care.  08/26/2022

## 2022-08-30 LAB
BACTERIA BLD CULT: NORMAL
BACTERIA BLD CULT: NORMAL

## 2022-09-02 ENCOUNTER — OFFICE VISIT (OUTPATIENT)
Dept: INTERNAL MEDICINE | Facility: CLINIC | Age: 67
End: 2022-09-02
Payer: MEDICAID

## 2022-09-02 VITALS
TEMPERATURE: 97 F | HEART RATE: 75 BPM | OXYGEN SATURATION: 96 % | SYSTOLIC BLOOD PRESSURE: 130 MMHG | DIASTOLIC BLOOD PRESSURE: 89 MMHG

## 2022-09-02 DIAGNOSIS — D69.6 THROMBOCYTOPENIA: Chronic | ICD-10-CM

## 2022-09-02 DIAGNOSIS — I69.951 HEMIPLEGIA AFFECTING RIGHT SIDE IN RIGHT-DOMINANT PATIENT AS LATE EFFECT OF CEREBROVASCULAR DISEASE: Chronic | ICD-10-CM

## 2022-09-02 DIAGNOSIS — K94.22 INFECTION OF PEG SITE: Primary | ICD-10-CM

## 2022-09-02 DIAGNOSIS — I71.43 ANEURYSM OF INFRARENAL ABDOMINAL AORTA: ICD-10-CM

## 2022-09-02 DIAGNOSIS — N28.9 RENAL LESION: ICD-10-CM

## 2022-09-02 DIAGNOSIS — C61 PROSTATE CANCER: Chronic | ICD-10-CM

## 2022-09-02 DIAGNOSIS — R11.10 NON-INTRACTABLE VOMITING, PRESENCE OF NAUSEA NOT SPECIFIED, UNSPECIFIED VOMITING TYPE: ICD-10-CM

## 2022-09-02 DIAGNOSIS — E11.59 TYPE 2 DIABETES MELLITUS WITH OTHER CIRCULATORY COMPLICATION, WITH LONG-TERM CURRENT USE OF INSULIN: ICD-10-CM

## 2022-09-02 DIAGNOSIS — I69.391 DYSPHAGIA AS LATE EFFECT OF STROKE: Chronic | ICD-10-CM

## 2022-09-02 DIAGNOSIS — Z79.4 TYPE 2 DIABETES MELLITUS WITH OTHER CIRCULATORY COMPLICATION, WITH LONG-TERM CURRENT USE OF INSULIN: ICD-10-CM

## 2022-09-02 DIAGNOSIS — I69.320 APHASIA AS LATE EFFECT OF STROKE: Chronic | ICD-10-CM

## 2022-09-02 PROBLEM — Z86.73 HISTORY OF TRANSIENT ISCHEMIC ATTACK (TIA): Status: RESOLVED | Noted: 2018-10-22 | Resolved: 2022-09-02

## 2022-09-02 PROBLEM — E87.20 LACTIC ACIDOSIS: Status: RESOLVED | Noted: 2022-08-24 | Resolved: 2022-09-02

## 2022-09-02 PROBLEM — R65.10 SIRS (SYSTEMIC INFLAMMATORY RESPONSE SYNDROME): Status: RESOLVED | Noted: 2022-08-24 | Resolved: 2022-09-02

## 2022-09-02 PROCEDURE — 3079F PR MOST RECENT DIASTOLIC BLOOD PRESSURE 80-89 MM HG: ICD-10-PCS | Mod: CPTII,,, | Performed by: FAMILY MEDICINE

## 2022-09-02 PROCEDURE — 3075F PR MOST RECENT SYSTOLIC BLOOD PRESS GE 130-139MM HG: ICD-10-PCS | Mod: CPTII,,, | Performed by: FAMILY MEDICINE

## 2022-09-02 PROCEDURE — 1159F MED LIST DOCD IN RCRD: CPT | Mod: CPTII,,, | Performed by: FAMILY MEDICINE

## 2022-09-02 PROCEDURE — 99205 OFFICE O/P NEW HI 60 MIN: CPT | Mod: S$PBB,,, | Performed by: FAMILY MEDICINE

## 2022-09-02 PROCEDURE — 1160F PR REVIEW ALL MEDS BY PRESCRIBER/CLIN PHARMACIST DOCUMENTED: ICD-10-PCS | Mod: CPTII,,, | Performed by: FAMILY MEDICINE

## 2022-09-02 PROCEDURE — 1160F RVW MEDS BY RX/DR IN RCRD: CPT | Mod: CPTII,,, | Performed by: FAMILY MEDICINE

## 2022-09-02 PROCEDURE — 99213 OFFICE O/P EST LOW 20 MIN: CPT | Mod: PBBFAC | Performed by: FAMILY MEDICINE

## 2022-09-02 PROCEDURE — 1159F PR MEDICATION LIST DOCUMENTED IN MEDICAL RECORD: ICD-10-PCS | Mod: CPTII,,, | Performed by: FAMILY MEDICINE

## 2022-09-02 PROCEDURE — 99999 PR PBB SHADOW E&M-EST. PATIENT-LVL III: ICD-10-PCS | Mod: PBBFAC,,, | Performed by: FAMILY MEDICINE

## 2022-09-02 PROCEDURE — 3044F PR MOST RECENT HEMOGLOBIN A1C LEVEL <7.0%: ICD-10-PCS | Mod: CPTII,,, | Performed by: FAMILY MEDICINE

## 2022-09-02 PROCEDURE — 3075F SYST BP GE 130 - 139MM HG: CPT | Mod: CPTII,,, | Performed by: FAMILY MEDICINE

## 2022-09-02 PROCEDURE — 99205 PR OFFICE/OUTPT VISIT, NEW, LEVL V, 60-74 MIN: ICD-10-PCS | Mod: S$PBB,,, | Performed by: FAMILY MEDICINE

## 2022-09-02 PROCEDURE — 3079F DIAST BP 80-89 MM HG: CPT | Mod: CPTII,,, | Performed by: FAMILY MEDICINE

## 2022-09-02 PROCEDURE — 3044F HG A1C LEVEL LT 7.0%: CPT | Mod: CPTII,,, | Performed by: FAMILY MEDICINE

## 2022-09-02 PROCEDURE — 99999 PR PBB SHADOW E&M-EST. PATIENT-LVL III: CPT | Mod: PBBFAC,,, | Performed by: FAMILY MEDICINE

## 2022-09-02 NOTE — ASSESSMENT & PLAN NOTE
Reported as in remission, but no apparent follow-up.  Lab Results   Component Value Date    PSADIAG 0.24 10/30/2018

## 2022-09-02 NOTE — ASSESSMENT & PLAN NOTE
Asymptomatic.  Lab Results   Component Value Date     (L) 08/26/2022     (L) 08/25/2022     08/24/2022     Lab Results   Component Value Date    WBC 8.05 08/26/2022    HGB 16.3 08/26/2022    HCT 48.1 08/26/2022    MCV 89 08/26/2022     (L) 08/26/2022

## 2022-09-02 NOTE — PROGRESS NOTES
OFFICE VISIT 9/2/22  3:00 PM CDT    CHIEF COMPLAINT: Follow-up (After recent hospitalization)    F/U Hospital observation 08/24/22 - 08/26/22    HPI: The patient is a 65 yo male nursing home resident with hx Stroke with right sided hemiparesis, Aphasia, Dysphagia s/p PEG placement, Prostate cancer, type II DM, HTN, and Hyperlipidemia who was sent to ED from Nursing home due to minor bleeding from PEG tube site and vomiting- onset today. Pt was seen pulling at PEG tube and there was concern for displacement. While in the ED, the pt was noted to have elevated WBC count and Lactic acid as well as a low grade fever. HM was notified.      In the ED, Temp 100.4F, WBC 13, Hgb 19, LA 3.4, Procalcitonin normal.   CXR showed Bibasilar opacities possibly related to interstitial lung disease.  Edema or early infection difficult to exclude but less favored.   CT abd showed G-tube in place in the stomach, Mild inflammatory stranding about the gastrostomy tube at the level of the skin surface,1.3 cm indeterminate superior pole left renal lesion, Bibasilar posterior dependent densities favored over aspiration or atelectasis, 3.4 cm infrarenal abdominal aortic aneurysm.    HOSPITAL COURSE: 67 y/o male admitted for infection of peg tube site and started on IV cefepime and doxycycline. Labs reviewed and stable. Wound care consult. Pt's WBC count returned to normal. Wound care recommends normal PEG tube site care. Patient's labs and VS stable today. Will dc back to [facility] where he is a resident. He will take Augmentin via PEG X 10 days and f/u as OP with PCP. Patient was seen and examined today and deemed stable for discharge home.    Gunner is a resident at [facility]. In attendance with him today was SNF JOCELYN Amos. Bette brought MAR, she reports that Gunner appears to be at his baseline, that Gunner is attended to at the SNF by Jh Knight, PATRICIA, but she could provide me no additional meaningful  history. MAR reflects that hospital D/C med of Augmentin was started yesterday.    I gave JOCELYN Mosesith copy of full letter to Jh Knight NP appended below, copies to follow by US Mail and Fax.    ASSESSMENT and PLAN    1. Infection of PEG site  Appears resolved/resolving. Recommend completing 10 day course of Augmentin as ordered.    2. Aneurysm of infrarenal abdominal aorta  See CT report. Asymptomatic. Recommend consideration of vascular surgery consult.    3. Renal lesion  See CT report. Asymptomatic. Recommend consideration of renal ultrasound and/or urology consult.     4. Prostate cancer  Recommend routine PSA monitoring or as recommended otherwise by his urologist.    5. 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    6. Thrombocytopenia  Mild. Asymptomatic. Recommend repeat CBC in 1 months.    7. Dysphagia as late effect of stroke  Reportedly stable.    8. Hemiplegia affecting dominant right side as late effect of cerebrovascular disease  Reportedly stable.    9. Aphasia as late effect of stroke  Reportedly stable.    10. Vomiting  Reportedly resolved.    Follow up if symptoms worsen or fail to improve.     Problem List Items Addressed This Visit       Aphasia as late effect of stroke (Chronic)    Dysphagia as late effect of stroke (Chronic)    Hemiplegia affecting dominant right side as late effect of cerebrovascular disease (Chronic)    Prostate cancer (Chronic)    Current Assessment & Plan     Reported as in remission, but no apparent follow-up.  Lab Results   Component Value Date    PSADIAG 0.24 10/30/2018             Thrombocytopenia (Chronic)    Current Assessment & Plan     Asymptomatic.  Lab Results   Component Value Date     (L) 08/26/2022     (L) 08/25/2022     08/24/2022     Lab Results   Component Value Date    WBC 8.05 08/26/2022    HGB 16.3 08/26/2022    HCT 48.1 08/26/2022    MCV 89  08/26/2022     (L) 08/26/2022               Type 2 diabetes mellitus with vascular complications (prior CVA) with long-term current use of insulin (Chronic)    Aneurysm of infrarenal abdominal aorta    Overview     CT ABDOMEN PELVIS WITHOUT CONTRAST 08/24/2022  FINDINGS:Heart: Normal in size. No pericardial effusion. Lung Bases: Bibasilar pulmonary opacities favoring posterior dependent densities but with infection or aspiration difficult to fully exclude. Probable emphysema. Liver: Normal in size and attenuation, with no focal hepatic lesions. Gallbladder: No calcified gallstones. Bile Ducts: No evidence of dilated ducts. Pancreas: No mass or peripancreatic fat stranding. Spleen: Unremarkable. Adrenals: Unremarkable. Kidneys/ Ureters: No hydronephrosis. No obstructive uropathy. Left greater than right nonobstructive nephrolithiasis. Indeterminate superior pole 1.3 cm left renal lesion. Outpatient ultrasound would be recommended for further characterization. Bladder: No evidence of wall thickening. Reproductive organs: Status post prostatectomy. GI Tract/Mesentery: Gastrostomy tube in place within the stomach. There is mild inflammatory stranding about the gastrostomy tube at the level of the skin surface. Small bowel and colon appear unremarkable. No evidence of appendicitis. Peritoneal Space: No ascites. No free air. Retroperitoneum: No significant adenopathy. Abdominal wall: Unremarkable. Vasculature: Infrarenal abdominal aortic aneurysm. This measures up to 3.4 cm. Moderate atherosclerosis. Bones: No acute fracture.  IMPRESSION:  Gastrostomy tube in place within the stomach. Mild inflammatory stranding about the gastrostomy tube at the level of the skin surface.  1.3 cm indeterminate superior pole left renal lesion. Outpatient ultrasound recommended for more complete characterization if not previously obtained.  Bibasilar posterior dependent densities favored over aspiration or atelectasis. Correlation is  advised.  3.4 cm infrarenal abdominal aortic aneurysm.         Infection of PEG site - Primary    Renal lesion    Overview     CT ABDOMEN PELVIS WITHOUT CONTRAST 08/24/2022  FINDINGS:Heart: Normal in size. No pericardial effusion. Lung Bases: Bibasilar pulmonary opacities favoring posterior dependent densities but with infection or aspiration difficult to fully exclude. Probable emphysema. Liver: Normal in size and attenuation, with no focal hepatic lesions. Gallbladder: No calcified gallstones. Bile Ducts: No evidence of dilated ducts. Pancreas: No mass or peripancreatic fat stranding. Spleen: Unremarkable. Adrenals: Unremarkable. Kidneys/ Ureters: No hydronephrosis. No obstructive uropathy. Left greater than right nonobstructive nephrolithiasis. Indeterminate superior pole 1.3 cm left renal lesion. Outpatient ultrasound would be recommended for further characterization. Bladder: No evidence of wall thickening. Reproductive organs: Status post prostatectomy. GI Tract/Mesentery: Gastrostomy tube in place within the stomach. There is mild inflammatory stranding about the gastrostomy tube at the level of the skin surface. Small bowel and colon appear unremarkable. No evidence of appendicitis. Peritoneal Space: No ascites. No free air. Retroperitoneum: No significant adenopathy. Abdominal wall: Unremarkable. Vasculature: Infrarenal abdominal aortic aneurysm. This measures up to 3.4 cm. Moderate atherosclerosis. Bones: No acute fracture.  IMPRESSION:  Gastrostomy tube in place within the stomach. Mild inflammatory stranding about the gastrostomy tube at the level of the skin surface.  1.3 cm indeterminate superior pole left renal lesion. Outpatient ultrasound recommended for more complete characterization if not previously obtained.  Bibasilar posterior dependent densities favored over aspiration or atelectasis. Correlation is advised.  3.4 cm infrarenal abdominal aortic aneurysm.          Other Visit Diagnoses        "Non-intractable vomiting, presence of nausea not specified, unspecified vomiting type            Unless noted herein, any chronic conditions are represented as and appear compensated/controlled and stable, and no other significant complaints or concerns were reported.    PRESCRIPTION DRUG MANAGEMENT  Outpatient Medications Prior to Visit   Medication Sig Dispense Refill    amoxicillin-clavulanate 875-125mg (AUGMENTIN) 875-125 mg per tablet 1 tablet by Per G Tube route 2 (two) times daily. for 10 days 20 tablet 0    aspirin (ECOTRIN) 81 MG EC tablet Take 1 tablet (81 mg total) by mouth once daily.  0    atorvastatin (LIPITOR) 40 MG tablet 40 mg by Per G Tube route every evening.      baclofen (LIORESAL) 10 MG tablet 10 mg by Per G Tube route once daily.      FLUoxetine 40 MG capsule 20 mg by Per G Tube route once daily.      insulin glargine 100 units/mL SubQ pen Inject 17 Units into the skin 2 (two) times a day.      insulin NPH isoph U-100 human (NOVOLIN N FLEXPEN) 100 unit/mL (3 mL) InPn Inject into the skin 3 (three) times daily before meals.      amLODIPine (NORVASC) 10 MG tablet Take 1 tablet (10 mg total) by mouth once daily. (FOR BLOOD PRESSURE) (Patient not taking: Reported on 9/2/2022) 90 tablet 3    oxybutynin (DITROPAN) 5 MG Tab Take 1 tablet (5 mg total) by mouth 3 (three) times daily. (Patient taking differently: Take 5 mg by mouth 2 (two) times daily.) 90 tablet 11    papaverine 30 mg/mL injection Inject 0.3 ml of trimix solution prn ED max once daily  Prepare 10ml of trimix solution containing:    Papaverine 30mg/ml    Phentolamine 1 mg/ml    Alprostadil 10mcg/ml  Dispense 10ml per refill  Qs syringes 1cc/30g/0.5" and alcohol swabs dispense as needed for Intracavernosal injection 10 mL 5     No facility-administered medications prior to visit.     Medications Discontinued During This Encounter   Medication Reason    papaverine 30 mg/mL injection Patient no longer taking    oxybutynin (DITROPAN) 5 MG " "Tab Patient no longer taking    amLODIPine (NORVASC) 10 MG tablet Patient no longer taking      PHYSICAL EXAM  Vitals:    09/02/22 1601 09/02/22 1631   BP: (!) 122/112 130/89   BP Location: Left arm Left arm   Patient Position: Sitting Sitting   BP Method: Medium (Manual) Medium (Automatic)   Pulse: 75    Temp: 97.2 °F (36.2 °C)    TempSrc: Tympanic    SpO2: 96%    Physical Exam  Vitals reviewed.   Constitutional:       General: He is not in acute distress.     Appearance: Normal appearance. He is not ill-appearing or diaphoretic.   Cardiovascular:      Rate and Rhythm: Normal rate and regular rhythm.   Pulmonary:      Effort: Pulmonary effort is normal.      Breath sounds: Normal breath sounds.   Abdominal:      Comments: See photos. PEG site looks OK. No tenderness. No hyperthermia. No induration. No abnormal drainage.   Skin:     General: Skin is warm and dry.   Neurological:      Mental Status: He is alert.      Comments: Right hemiplegia.   Psychiatric:      Comments: Mood appears good. Answers "yeah" to any question or statement, which Bette says is his normal behavior.              TOTAL TIME evaluating and managing this patient for this encounter was greater than or equal to 70 minutes. This time was spent personally by me on the following activities: pre-charting, review of patient's past medical history, assessing age-appropriate health maintenance needs, review of any interval history, obtaining history from the patient, examination of the patient, review and interpretation of lab results, review and interpretation of imaging test results, review of ED/Hospital records, evaluation of the patient's response to treatment, medication reconciliation, communicating assessment and plan with SNF and SNF providers, communication with patient's surrogate (Bette) about patient's condition and treatment plan, and final documentation of the visit. This time was exclusive of any separately billable procedures for this " "patient and exclusive of time spent treating any other patients.   Documentation entered by me for this encounter may have been done in part using speech-recognition technology. Although I have made an effort to ensure accuracy, "sound like" errors may exist and should be interpreted in context.              ADY Dawn MD  [facility]  [address]    [place] [address]  PH: [phone]    FX: [phone]          2022      Jh Knight, NP    [address]; [address]    [place] [id number]    Via US Mail and via fax [phone]       RE:  Gunner Estes,  1955   (our MRN [id number])      Dear Dr. Ledesma Eugene:    I saw Jh after his hospital observation -2022 for vomiting and PEG site infection.    He appears to be doing well. I recommend completing Augmentin as previously ordered.    I've attached a summary of my assessments and recommendations, along with a copy of his recent imaging and lab results.    Please call me if you have any questions or if I can be of any further service.    Kind regards,    ADY Dawn MD    C: Director of Nursing, [facility], [address] [place] [address] , [place] [address]    Enclosure          2022 Assessment and Plan for Gunner Estes,  1955    1. Infection of PEG site  Appears resolved/resolving. Recommend completing 10 day course of Augmentin as ordered.    2. Aneurysm of infrarenal abdominal aorta  See CT report. Asymptomatic. Recommend consideration of vascular surgery consult.    3. Renal lesion  See CT report. Asymptomatic. Recommend consideration of renal ultrasound and/or urology consult.     4. Prostate cancer  Recommend routine PSA monitoring or as recommended otherwise by his urologist.    5. 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    6. Thrombocytopenia  Mild. " Asymptomatic. Recommend repeat CBC in 1 months.    7. Dysphagia as late effect of stroke  Reportedly stable.    8. Hemiplegia affecting dominant right side as late effect of cerebrovascular disease  Reportedly stable.    9. Aphasia as late effect of stroke  Reportedly stable.    10. Vomiting  Reportedly resolved.     ADY Dawn MD        Lab Test Results for Leonides Garay 1955    Admission on 2022, Discharged on 2022   Component Date Value Ref Range    WBC 2022 13.56 (H)  3.90 - 12.70 K/uL    RBC 2022 6.35 (H)  4.60 - 6.20 M/uL    Hemoglobin 2022 19.2 (H)  14.0 - 18.0 g/dL    Hematocrit 2022 56.0 (H)  40.0 - 54.0 %    MCV 2022 88  82 - 98 fL    MCH 2022 30.2  27.0 - 31.0 pg    MCHC 2022 34.3  32.0 - 36.0 g/dL    RDW 2022 14.1  11.5 - 14.5 %    Platelets 2022 176  150 - 450 K/uL    MPV 2022 13.2 (H)  9.2 - 12.9 fL    Immature Granulocytes 2022 0.4  0.0 - 0.5 %    Gran # (ANC) 2022 11.0 (H)  1.8 - 7.7 K/uL    Immature Grans (Abs) 2022 0.06 (H)  0.00 - 0.04 K/uL    Lymph # 2022 1.3  1.0 - 4.8 K/uL    Mono # 2022 1.1 (H)  0.3 - 1.0 K/uL    Eos # 2022 0.2  0.0 - 0.5 K/uL    Baso # 2022 0.04  0.00 - 0.20 K/uL    nRBC 2022 0  0 /100 WBC    Gran % 2022 81.0 (H)  38.0 - 73.0 %    Lymph % 2022 9.4 (L)  18.0 - 48.0 %    Mono % 2022 7.8  4.0 - 15.0 %    Eosinophil % 2022 1.1  0.0 - 8.0 %    Basophil % 2022 0.3  0.0 - 1.9 %    Differential Method 2022 Automated      Sodium 2022 140  136 - 145 mmol/L    Potassium 2022 4.6  3.5 - 5.1 mmol/L    Chloride 2022 104  95 - 110 mmol/L    CO2 2022 22 (L)  23 - 29 mmol/L    Glucose 2022 106  70 - 110 mg/dL    BUN 2022 14  8 - 23 mg/dL    Creatinine 2022 0.8  0.5 - 1.4 mg/dL    Calcium 2022 9.9  8.7 - 10.5 mg/dL    Total Protein 2022 8.7 (H)  6.0 - 8.4 g/dL     Albumin 08/24/2022 3.9  3.5 - 5.2 g/dL    Total Bilirubin 08/24/2022 0.6  0.1 - 1.0 mg/dL    Alkaline Phosphatase 08/24/2022 140 (H)  55 - 135 U/L    AST 08/24/2022 51 (H)  10 - 40 U/L    ALT 08/24/2022 95 (H)  10 - 44 U/L    Anion Gap 08/24/2022 14  8 - 16 mmol/L    eGFR 08/24/2022 >60  >60 mL/min/1.73 m^2    Blood Culture, Routine 08/24/2022 No growth after 5 days.      Blood Culture, Routine 08/24/2022 No growth after 5 days.      Lactate (Lactic Acid) 08/24/2022 3.4 (H)  0.5 - 2.2 mmol/L    Specimen UA 08/25/2022 Urine, Clean Catch      Color, UA 08/25/2022 Yellow  Yellow, Straw, Karoline    Appearance, UA 08/25/2022 Clear  Clear    pH, UA 08/25/2022 7.0  5.0 - 8.0    Specific Gravity, UA 08/25/2022 1.015  1.005 - 1.030    Protein, UA 08/25/2022 Negative  Negative    Glucose, UA 08/25/2022 Negative  Negative    Ketones, UA 08/25/2022 Negative  Negative    Bilirubin (UA) 08/25/2022 Negative  Negative    Occult Blood UA 08/25/2022 Negative  Negative    Nitrite, UA 08/25/2022 Negative  Negative    Urobilinogen, UA 08/25/2022 Negative  <2.0 EU/dL    Leukocytes, UA 08/25/2022 Negative  Negative    Procalcitonin 08/24/2022 0.03  <0.25 ng/mL    Lipase 08/24/2022 31  4 - 60 U/L    SARS-CoV-2 RNA, Amplification, Qual 08/24/2022 Negative  Negative    Lactate (Lactic Acid) 08/24/2022 3.2 (H)  0.5 - 2.2 mmol/L    Hemoglobin A1C 08/24/2022 5.5  4.0 - 5.6 %    Estimated Avg Glucose 08/24/2022 111  68 - 131 mg/dL    Sodium 08/25/2022 137  136 - 145 mmol/L    Potassium 08/25/2022 4.2  3.5 - 5.1 mmol/L    Chloride 08/25/2022 105  95 - 110 mmol/L    CO2 08/25/2022 23  23 - 29 mmol/L    Glucose 08/25/2022 116 (H)  70 - 110 mg/dL    BUN 08/25/2022 14  8 - 23 mg/dL    Creatinine 08/25/2022 0.8  0.5 - 1.4 mg/dL    Calcium 08/25/2022 8.8  8.7 - 10.5 mg/dL    Total Protein 08/25/2022 6.5  6.0 - 8.4 g/dL    Albumin 08/25/2022 3.2 (L)  3.5 - 5.2 g/dL    Total Bilirubin 08/25/2022 0.6  0.1 - 1.0 mg/dL    Alkaline Phosphatase 08/25/2022 110   55 - 135 U/L    AST 08/25/2022 37  10 - 40 U/L    ALT 08/25/2022 69 (H)  10 - 44 U/L    Anion Gap 08/25/2022 9  8 - 16 mmol/L    eGFR 08/25/2022 >60  >60 mL/min/1.73 m^2    WBC 08/25/2022 10.66  3.90 - 12.70 K/uL    RBC 08/25/2022 5.56  4.60 - 6.20 M/uL    Hemoglobin 08/25/2022 16.7  14.0 - 18.0 g/dL    Hematocrit 08/25/2022 49.5  40.0 - 54.0 %    MCV 08/25/2022 89  82 - 98 fL    MCH 08/25/2022 30.0  27.0 - 31.0 pg    MCHC 08/25/2022 33.7  32.0 - 36.0 g/dL    RDW 08/25/2022 14.0  11.5 - 14.5 %    Platelets 08/25/2022 141 (L)  150 - 450 K/uL    MPV 08/25/2022 13.3 (H)  9.2 - 12.9 fL    Lactate (Lactic Acid) 08/25/2022 1.2  0.5 - 2.2 mmol/L    POCT Glucose 08/25/2022 92  70 - 110 mg/dL    POCT Glucose 08/25/2022 111 (H)  70 - 110 mg/dL    POCT Glucose 08/25/2022 109  70 - 110 mg/dL    POCT Glucose 08/25/2022 128 (H)  70 - 110 mg/dL    POCT Glucose 08/26/2022 116 (H)  70 - 110 mg/dL    Sodium 08/26/2022 140  136 - 145 mmol/L    Potassium 08/26/2022 4.2  3.5 - 5.1 mmol/L    Chloride 08/26/2022 108  95 - 110 mmol/L    CO2 08/26/2022 23  23 - 29 mmol/L    Glucose 08/26/2022 115 (H)  70 - 110 mg/dL    BUN 08/26/2022 14  8 - 23 mg/dL    Creatinine 08/26/2022 0.7  0.5 - 1.4 mg/dL    Calcium 08/26/2022 9.0  8.7 - 10.5 mg/dL    Total Protein 08/26/2022 6.6  6.0 - 8.4 g/dL    Albumin 08/26/2022 3.2 (L)  3.5 - 5.2 g/dL    Total Bilirubin 08/26/2022 0.4  0.1 - 1.0 mg/dL    Alkaline Phosphatase 08/26/2022 111  55 - 135 U/L    AST 08/26/2022 27  10 - 40 U/L    ALT 08/26/2022 53 (H)  10 - 44 U/L    Anion Gap 08/26/2022 9  8 - 16 mmol/L    eGFR 08/26/2022 >60  >60 mL/min/1.73 m^2    WBC 08/26/2022 8.05  3.90 - 12.70 K/uL    RBC 08/26/2022 5.40  4.60 - 6.20 M/uL    Hemoglobin 08/26/2022 16.3  14.0 - 18.0 g/dL    Hematocrit 08/26/2022 48.1  40.0 - 54.0 %    MCV 08/26/2022 89  82 - 98 fL    MCH 08/26/2022 30.2  27.0 - 31.0 pg    MCHC 08/26/2022 33.9  32.0 - 36.0 g/dL    RDW 08/26/2022 14.0  11.5 - 14.5 %    Platelets 08/26/2022 131 (L)   150 - 450 K/uL    MPV 2022 13.0 (H)  9.2 - 12.9 fL    POCT Glucose 2022 115 (H)  70 - 110 mg/dL    POCT Glucose 2022 117 (H)  70 - 110 mg/dL           Imaging Results for Leonides Garay 1955    XR CHEST AP PORTABLE 2022    FINDINGS: Bibasilar opacities possibly related to interstitial lung disease. Edema or early infection difficult to exclude but less favored. No pleural fluid or pneumothorax.  The cardiac silhouette is normal in size. The hilar and mediastinal contours are unremarkable. Bones are intact.    IMPRESSION: As above.    Electronically signed by: Morris Castillo  Date:    2022  Time:    19:40        Imaging Results for Leonides Garay 1955    CT ABDOMEN PELVIS WITHOUT CONTRAST 2022    FINDINGS:Heart: Normal in size. No pericardial effusion. Lung Bases: Bibasilar pulmonary opacities favoring posterior dependent densities but with infection or aspiration difficult to fully exclude. Probable emphysema. Liver: Normal in size and attenuation, with no focal hepatic lesions. Gallbladder: No calcified gallstones. Bile Ducts: No evidence of dilated ducts. Pancreas: No mass or peripancreatic fat stranding. Spleen: Unremarkable. Adrenals: Unremarkable. Kidneys/ Ureters: No hydronephrosis. No obstructive uropathy. Left greater than right nonobstructive nephrolithiasis. Indeterminate superior pole 1.3 cm left renal lesion. Outpatient ultrasound would be recommended for further characterization. Bladder: No evidence of wall thickening. Reproductive organs: Status post prostatectomy. GI Tract/Mesentery: Gastrostomy tube in place within the stomach. There is mild inflammatory stranding about the gastrostomy tube at the level of the skin surface. Small bowel and colon appear unremarkable. No evidence of appendicitis. Peritoneal Space: No ascites. No free air. Retroperitoneum: No significant adenopathy. Abdominal wall: Unremarkable. Vasculature: Infrarenal abdominal  aortic aneurysm. This measures up to 3.4 cm. Moderate atherosclerosis. Bones: No acute fracture.    IMPRESSION:  Gastrostomy tube in place within the stomach. Mild inflammatory stranding about the gastrostomy tube at the level of the skin surface.  1.3 cm indeterminate superior pole left renal lesion. Outpatient ultrasound recommended for more complete characterization if not previously obtained.  Bibasilar posterior dependent densities favored over aspiration or atelectasis. Correlation is advised.  3.4 cm infrarenal abdominal aortic aneurysm.    Electronically signed by: Morris Castillo  Date:    08/24/2022  Time:    19:29

## 2022-09-02 NOTE — LETTER
ADY Dawn MD  [facility]  [address] [facility]    [place] [address]  PH: [phone]    FX: [phone]          2022      Jh Eugene, NP    [address]; [address]    [place] [id number]    Via [place] Mail and via fax [phone]       RE:  Gunner EstesLeonides 1955   (our MRN [id number])      Dear Dr. Ledesma Eugene:    I saw Jh after his hospital observation -2022 for vomiting and PEG site infection.    He appears to be doing well. I recommend completing Augmentin as previously ordered.    I've attached a summary of my assessments and recommendations, along with a copy of his recent imaging and lab results.    Please call me if you have any questions or if I can be of any further service.    Kind regards,    ADY Dawn MD    C: Director of Nursing, [facility], [address] [place] [address] , [place] [address]    Enclosure          2022 Assessment and Plan for Gunner Estes, Leonides 1955    1. Infection of PEG site  Appears resolved/resolving. Recommend completing 10 day course of Augmentin as ordered.    2. Aneurysm of infrarenal abdominal aorta  See CT report. Asymptomatic. Recommend consideration of vascular surgery consult.    3. Renal lesion  See CT report. Asymptomatic. Recommend consideration of renal ultrasound and/or urology consult.     4. Prostate cancer  Recommend routine PSA monitoring or as recommended otherwise by his urologist.    5. 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    6. Thrombocytopenia  Mild. Asymptomatic. Recommend repeat CBC in 1 months.    7. Dysphagia as late effect of stroke  Reportedly stable.    8. Hemiplegia affecting dominant right side as late effect of cerebrovascular disease  Reportedly stable.    9. Aphasia as late effect of stroke  Reportedly stable.    10. Vomiting  Reportedly  nash Dawn MD        Lab Test Results for Gunner Estes,  1955    Admission on 2022, Discharged on 2022   Component Date Value Ref Range    WBC 2022 13.56 (H)  3.90 - 12.70 K/uL    RBC 2022 6.35 (H)  4.60 - 6.20 M/uL    Hemoglobin 2022 19.2 (H)  14.0 - 18.0 g/dL    Hematocrit 2022 56.0 (H)  40.0 - 54.0 %    MCV 2022 88  82 - 98 fL    MCH 2022 30.2  27.0 - 31.0 pg    MCHC 2022 34.3  32.0 - 36.0 g/dL    RDW 2022 14.1  11.5 - 14.5 %    Platelets 2022 176  150 - 450 K/uL    MPV 2022 13.2 (H)  9.2 - 12.9 fL    Immature Granulocytes 2022 0.4  0.0 - 0.5 %    Gran # (ANC) 2022 11.0 (H)  1.8 - 7.7 K/uL    Immature Grans (Abs) 2022 0.06 (H)  0.00 - 0.04 K/uL    Lymph # 2022 1.3  1.0 - 4.8 K/uL    Mono # 2022 1.1 (H)  0.3 - 1.0 K/uL    Eos # 2022 0.2  0.0 - 0.5 K/uL    Baso # 2022 0.04  0.00 - 0.20 K/uL    nRBC 2022 0  0 /100 WBC    Gran % 2022 81.0 (H)  38.0 - 73.0 %    Lymph % 2022 9.4 (L)  18.0 - 48.0 %    Mono % 2022 7.8  4.0 - 15.0 %    Eosinophil % 2022 1.1  0.0 - 8.0 %    Basophil % 2022 0.3  0.0 - 1.9 %    Differential Method 2022 Automated      Sodium 2022 140  136 - 145 mmol/L    Potassium 2022 4.6  3.5 - 5.1 mmol/L    Chloride 2022 104  95 - 110 mmol/L    CO2 2022 22 (L)  23 - 29 mmol/L    Glucose 2022 106  70 - 110 mg/dL    BUN 2022 14  8 - 23 mg/dL    Creatinine 2022 0.8  0.5 - 1.4 mg/dL    Calcium 2022 9.9  8.7 - 10.5 mg/dL    Total Protein 2022 8.7 (H)  6.0 - 8.4 g/dL    Albumin 2022 3.9  3.5 - 5.2 g/dL    Total Bilirubin 2022 0.6  0.1 - 1.0 mg/dL    Alkaline Phosphatase 2022 140 (H)  55 - 135 U/L    AST 2022 51 (H)  10 - 40 U/L    ALT 2022 95 (H)  10 - 44 U/L    Anion Gap 2022 14  8 - 16 mmol/L    eGFR 2022 >60  >60 mL/min/1.73 m^2     Blood Culture, Routine 08/24/2022 No growth after 5 days.      Blood Culture, Routine 08/24/2022 No growth after 5 days.      Lactate (Lactic Acid) 08/24/2022 3.4 (H)  0.5 - 2.2 mmol/L    Specimen UA 08/25/2022 Urine, Clean Catch      Color, UA 08/25/2022 Yellow  Yellow, Straw, Karoline    Appearance, UA 08/25/2022 Clear  Clear    pH, UA 08/25/2022 7.0  5.0 - 8.0    Specific Gravity, UA 08/25/2022 1.015  1.005 - 1.030    Protein, UA 08/25/2022 Negative  Negative    Glucose, UA 08/25/2022 Negative  Negative    Ketones, UA 08/25/2022 Negative  Negative    Bilirubin (UA) 08/25/2022 Negative  Negative    Occult Blood UA 08/25/2022 Negative  Negative    Nitrite, UA 08/25/2022 Negative  Negative    Urobilinogen, UA 08/25/2022 Negative  <2.0 EU/dL    Leukocytes, UA 08/25/2022 Negative  Negative    Procalcitonin 08/24/2022 0.03  <0.25 ng/mL    Lipase 08/24/2022 31  4 - 60 U/L    SARS-CoV-2 RNA, Amplification, Qual 08/24/2022 Negative  Negative    Lactate (Lactic Acid) 08/24/2022 3.2 (H)  0.5 - 2.2 mmol/L    Hemoglobin A1C 08/24/2022 5.5  4.0 - 5.6 %    Estimated Avg Glucose 08/24/2022 111  68 - 131 mg/dL    Sodium 08/25/2022 137  136 - 145 mmol/L    Potassium 08/25/2022 4.2  3.5 - 5.1 mmol/L    Chloride 08/25/2022 105  95 - 110 mmol/L    CO2 08/25/2022 23  23 - 29 mmol/L    Glucose 08/25/2022 116 (H)  70 - 110 mg/dL    BUN 08/25/2022 14  8 - 23 mg/dL    Creatinine 08/25/2022 0.8  0.5 - 1.4 mg/dL    Calcium 08/25/2022 8.8  8.7 - 10.5 mg/dL    Total Protein 08/25/2022 6.5  6.0 - 8.4 g/dL    Albumin 08/25/2022 3.2 (L)  3.5 - 5.2 g/dL    Total Bilirubin 08/25/2022 0.6  0.1 - 1.0 mg/dL    Alkaline Phosphatase 08/25/2022 110  55 - 135 U/L    AST 08/25/2022 37  10 - 40 U/L    ALT 08/25/2022 69 (H)  10 - 44 U/L    Anion Gap 08/25/2022 9  8 - 16 mmol/L    eGFR 08/25/2022 >60  >60 mL/min/1.73 m^2    WBC 08/25/2022 10.66  3.90 - 12.70 K/uL    RBC 08/25/2022 5.56  4.60 - 6.20 M/uL    Hemoglobin 08/25/2022 16.7  14.0 - 18.0 g/dL     Hematocrit 2022 49.5  40.0 - 54.0 %    MCV 2022 89  82 - 98 fL    MCH 2022 30.0  27.0 - 31.0 pg    MCHC 2022 33.7  32.0 - 36.0 g/dL    RDW 2022 14.0  11.5 - 14.5 %    Platelets 2022 141 (L)  150 - 450 K/uL    MPV 2022 13.3 (H)  9.2 - 12.9 fL    Lactate (Lactic Acid) 2022 1.2  0.5 - 2.2 mmol/L    POCT Glucose 2022 92  70 - 110 mg/dL    POCT Glucose 2022 111 (H)  70 - 110 mg/dL    POCT Glucose 2022 109  70 - 110 mg/dL    POCT Glucose 2022 128 (H)  70 - 110 mg/dL    POCT Glucose 2022 116 (H)  70 - 110 mg/dL    Sodium 2022 140  136 - 145 mmol/L    Potassium 2022 4.2  3.5 - 5.1 mmol/L    Chloride 2022 108  95 - 110 mmol/L    CO2 2022 23  23 - 29 mmol/L    Glucose 2022 115 (H)  70 - 110 mg/dL    BUN 2022 14  8 - 23 mg/dL    Creatinine 2022 0.7  0.5 - 1.4 mg/dL    Calcium 2022 9.0  8.7 - 10.5 mg/dL    Total Protein 2022 6.6  6.0 - 8.4 g/dL    Albumin 2022 3.2 (L)  3.5 - 5.2 g/dL    Total Bilirubin 2022 0.4  0.1 - 1.0 mg/dL    Alkaline Phosphatase 2022 111  55 - 135 U/L    AST 2022 27  10 - 40 U/L    ALT 2022 53 (H)  10 - 44 U/L    Anion Gap 2022 9  8 - 16 mmol/L    eGFR 2022 >60  >60 mL/min/1.73 m^2    WBC 2022 8.05  3.90 - 12.70 K/uL    RBC 2022 5.40  4.60 - 6.20 M/uL    Hemoglobin 2022 16.3  14.0 - 18.0 g/dL    Hematocrit 2022 48.1  40.0 - 54.0 %    MCV 2022 89  82 - 98 fL    MCH 2022 30.2  27.0 - 31.0 pg    MCHC 2022 33.9  32.0 - 36.0 g/dL    RDW 2022 14.0  11.5 - 14.5 %    Platelets 2022 131 (L)  150 - 450 K/uL    MPV 2022 13.0 (H)  9.2 - 12.9 fL    POCT Glucose 2022 115 (H)  70 - 110 mg/dL    POCT Glucose 2022 117 (H)  70 - 110 mg/dL           Imaging Results for Gunner EstesLeonides 1955    XR CHEST AP PORTABLE 2022    FINDINGS: Bibasilar opacities possibly related  to interstitial lung disease. Edema or early infection difficult to exclude but less favored. No pleural fluid or pneumothorax.  The cardiac silhouette is normal in size. The hilar and mediastinal contours are unremarkable. Bones are intact.    IMPRESSION: As above.    Electronically signed by: Morris Castillo  Date:    2022  Time:    19:40        Imaging Results for Gunner Estes,  1955    CT ABDOMEN PELVIS WITHOUT CONTRAST 2022    FINDINGS:Heart: Normal in size. No pericardial effusion. Lung Bases: Bibasilar pulmonary opacities favoring posterior dependent densities but with infection or aspiration difficult to fully exclude. Probable emphysema. Liver: Normal in size and attenuation, with no focal hepatic lesions. Gallbladder: No calcified gallstones. Bile Ducts: No evidence of dilated ducts. Pancreas: No mass or peripancreatic fat stranding. Spleen: Unremarkable. Adrenals: Unremarkable. Kidneys/ Ureters: No hydronephrosis. No obstructive uropathy. Left greater than right nonobstructive nephrolithiasis. Indeterminate superior pole 1.3 cm left renal lesion. Outpatient ultrasound would be recommended for further characterization. Bladder: No evidence of wall thickening. Reproductive organs: Status post prostatectomy. GI Tract/Mesentery: Gastrostomy tube in place within the stomach. There is mild inflammatory stranding about the gastrostomy tube at the level of the skin surface. Small bowel and colon appear unremarkable. No evidence of appendicitis. Peritoneal Space: No ascites. No free air. Retroperitoneum: No significant adenopathy. Abdominal wall: Unremarkable. Vasculature: Infrarenal abdominal aortic aneurysm. This measures up to 3.4 cm. Moderate atherosclerosis. Bones: No acute fracture.    IMPRESSION:  Gastrostomy tube in place within the stomach. Mild inflammatory stranding about the gastrostomy tube at the level of the skin surface.  1.3 cm indeterminate superior pole left renal lesion.  Outpatient ultrasound recommended for more complete characterization if not previously obtained.  Bibasilar posterior dependent densities favored over aspiration or atelectasis. Correlation is advised.  3.4 cm infrarenal abdominal aortic aneurysm.    Electronically signed by: Morris Castillo  Date:    08/24/2022  Time:    19:29

## 2022-09-06 ENCOUNTER — DOCUMENTATION ONLY (OUTPATIENT)
Dept: INTERNAL MEDICINE | Facility: CLINIC | Age: 67
End: 2022-09-06

## 2022-11-28 PROBLEM — I63.9 STROKE: Status: RESOLVED | Noted: 2022-08-24 | Resolved: 2022-11-28
